# Patient Record
Sex: MALE | Race: OTHER | NOT HISPANIC OR LATINO | Employment: OTHER | URBAN - METROPOLITAN AREA
[De-identification: names, ages, dates, MRNs, and addresses within clinical notes are randomized per-mention and may not be internally consistent; named-entity substitution may affect disease eponyms.]

---

## 2017-01-04 ENCOUNTER — PATIENT OUTREACH (OUTPATIENT)
Dept: OTHER | Facility: OTHER | Age: 42
End: 2017-01-04
Payer: COMMERCIAL

## 2017-01-04 DIAGNOSIS — I10 ESSENTIAL HYPERTENSION: Primary | ICD-10-CM

## 2017-01-05 RX ORDER — CHLORTHALIDONE 25 MG/1
25 TABLET ORAL DAILY
Qty: 30 TABLET | Refills: 1 | Status: SHIPPED | OUTPATIENT
Start: 2017-01-05 | End: 2017-01-23 | Stop reason: SDUPTHER

## 2017-01-05 NOTE — PROGRESS NOTES
Mr. Patton is doing fine. He is tolerating hydralazine increase without problems. He attributes elevated reading yesterday to lack of sleep.     Last 5 Patient Entered Readings                                                               Current 30 Day Average: 159/86     Recent Readings 1/4/2017 12/28/2016 12/28/2016 12/14/2016 12/14/2016    Systolic BP (mmHg) 191 151 150 144 157    Diastolic BP (mmHg) 102 77 79 77 81    Pulse 66 72 75 74 75      BP elevated  Start chlorthalidone 25mg QAM  Continue monitoring    Outpatient Hypertension Medications as of 1/5/2017             amlodipine (NORVASC) 10 MG tablet Take 1 tablet (10 mg total) by mouth once daily.    carvedilol (COREG) 25 MG tablet Take 1 tablet (25 mg total) by mouth 2 (two) times daily with meals.    chlorthalidone (HYGROTEN) 25 MG Tab Take 1 tablet (25 mg total) by mouth once daily.    hydrALAZINE (APRESOLINE) 100 MG tablet Take 1 tablet (100 mg total) by mouth every 8 (eight) hours.    lisinopril (PRINIVIL,ZESTRIL) 40 MG tablet Take 1 tablet (40 mg total) by mouth once daily.

## 2017-01-12 ENCOUNTER — PATIENT OUTREACH (OUTPATIENT)
Dept: OTHER | Facility: OTHER | Age: 42
End: 2017-01-12
Payer: COMMERCIAL

## 2017-01-12 NOTE — PROGRESS NOTES
Last 5 Patient Entered Readings                                                               Current 30 Day Average: 135/73     Recent Readings 2/28/2017 2/28/2017 2/26/2017 2/26/2017 2/10/2017    Systolic BP (mmHg) 110 119 148 172 127    Diastolic BP (mmHg) 59 65 82 91 65    Pulse 74 73 76 77 74        LVM to follow up with Mr. Edgar Patton. Inquired about how his low sodium diet and exercise is going. Advised pt to call or message back if he has any questions or concerns.

## 2017-01-23 ENCOUNTER — PATIENT OUTREACH (OUTPATIENT)
Dept: OTHER | Facility: OTHER | Age: 42
End: 2017-01-23
Payer: COMMERCIAL

## 2017-01-23 DIAGNOSIS — E11.9 TYPE 2 DIABETES MELLITUS WITHOUT COMPLICATION, WITHOUT LONG-TERM CURRENT USE OF INSULIN: ICD-10-CM

## 2017-01-23 DIAGNOSIS — I10 ESSENTIAL HYPERTENSION: ICD-10-CM

## 2017-01-23 RX ORDER — LISINOPRIL 40 MG/1
40 TABLET ORAL DAILY
Qty: 90 TABLET | Refills: 2 | Status: SHIPPED | OUTPATIENT
Start: 2017-01-23 | End: 2018-10-30 | Stop reason: SDUPTHER

## 2017-01-23 RX ORDER — CHLORTHALIDONE 25 MG/1
25 TABLET ORAL DAILY
Qty: 90 TABLET | Refills: 2 | Status: SHIPPED | OUTPATIENT
Start: 2017-01-23 | End: 2017-02-13

## 2017-01-23 RX ORDER — AMLODIPINE BESYLATE 10 MG/1
10 TABLET ORAL DAILY
Qty: 90 TABLET | Refills: 2 | Status: SHIPPED | OUTPATIENT
Start: 2017-01-23 | End: 2018-08-20

## 2017-01-23 RX ORDER — CARVEDILOL 25 MG/1
25 TABLET ORAL 2 TIMES DAILY WITH MEALS
Qty: 180 TABLET | Refills: 2 | Status: SHIPPED | OUTPATIENT
Start: 2017-01-23 | End: 2018-10-30 | Stop reason: SDUPTHER

## 2017-01-23 RX ORDER — HYDRALAZINE HYDROCHLORIDE 100 MG/1
100 TABLET, FILM COATED ORAL EVERY 8 HOURS
Qty: 270 TABLET | Refills: 2 | Status: SHIPPED | OUTPATIENT
Start: 2017-01-23 | End: 2017-02-13

## 2017-01-23 NOTE — PROGRESS NOTES
Mr. Patton is doing well. He attributes better readings today to sleeping over night. He notes he discontinued amlodipine in error. He would like all BP meds refilled.     Last 5 Patient Entered Readings                                                               Current 30 Day Average: 166/97     Recent Readings 1/23/2017 1/23/2017 1/22/2017 1/18/2017 1/4/2017    Systolic BP (mmHg) 143 152 179 175 174    Diastolic BP (mmHg) 90 92 117 101 95    Pulse 65 64 73 80 68      BP trending down?  Restart amlodipine 10mg daily  Continue monitoring    Outpatient Hypertension Medications as of 1/23/2017             amlodipine (NORVASC) 10 MG tablet Take 1 tablet (10 mg total) by mouth once daily.    carvedilol (COREG) 25 MG tablet Take 1 tablet (25 mg total) by mouth 2 (two) times daily with meals.    chlorthalidone (HYGROTEN) 25 MG Tab Take 1 tablet (25 mg total) by mouth once daily.    hydrALAZINE (APRESOLINE) 100 MG tablet Take 1 tablet (100 mg total) by mouth every 8 (eight) hours.    lisinopril (PRINIVIL,ZESTRIL) 40 MG tablet Take 1 tablet (40 mg total) by mouth once daily.

## 2017-02-08 ENCOUNTER — NURSE TRIAGE (OUTPATIENT)
Dept: ADMINISTRATIVE | Facility: CLINIC | Age: 42
End: 2017-02-08

## 2017-02-08 NOTE — TELEPHONE ENCOUNTER
"  Reason for Disposition   [1] Caller requesting NON-URGENT health information AND [2] PCP's office is the best resource    Answer Assessment - Initial Assessment Questions  1. REASON FOR CALL or QUESTION: "What is your reason for calling today?" or "How can I best help you?" or "What question do you have that I can help answer?"      Patient's spouse called to report he is experiencing dizziness when his blood pressure is within normal range. She states his blood pressure was 180/90 this morning prior to taking medication. She states after medication his blood pressure went down to 126/80. Caller would like to know if medication adjustment needs to be made.    Protocols used: ST INFORMATION ONLY CALL-A-AH    "

## 2017-02-13 ENCOUNTER — PATIENT OUTREACH (OUTPATIENT)
Dept: OTHER | Facility: OTHER | Age: 42
End: 2017-02-13
Payer: COMMERCIAL

## 2017-02-13 NOTE — PROGRESS NOTES
Mr. Patton is doing ok. He was dizzy last week due to BP lowering. He feels better now. He discontinued hydralazine on his own. He also notes he never started chlorthalidone as directed.       Last 5 Patient Entered Readings                                                               Current 30 Day Average: 154/91     Recent Readings 2/10/2017 2/10/2017 2/8/2017 2/7/2017 2/7/2017    Systolic BP (mmHg) 127 132 132 143 144    Diastolic BP (mmHg) 65 71 74 76 83    Pulse 74 73 69 74 77        BP trending down  Increase monitoring frequency  Discussed with pt that it is normal to have some dizziness- will cut back on medication if needed    Hypertension Medications             amlodipine (NORVASC) 10 MG tablet Take 1 tablet (10 mg total) by mouth once daily.    carvedilol (COREG) 25 MG tablet Take 1 tablet (25 mg total) by mouth 2 (two) times daily with meals.    lisinopril (PRINIVIL,ZESTRIL) 40 MG tablet Take 1 tablet (40 mg total) by mouth once daily.

## 2017-02-21 ENCOUNTER — PATIENT OUTREACH (OUTPATIENT)
Dept: OTHER | Facility: OTHER | Age: 42
End: 2017-02-21
Payer: COMMERCIAL

## 2017-02-21 NOTE — PROGRESS NOTES
Mr. Patton is feeling much better since med adjustment. He is no longer dizzy. He has not had a chance to take BP readings.     Last 5 Patient Entered Readings                                                               Current 30 Day Average: 151/90     Recent Readings 2/10/2017 2/10/2017 2/8/2017 2/7/2017 2/7/2017    Systolic BP (mmHg) 127 132 132 143 144    Diastolic BP (mmHg) 65 71 74 76 83    Pulse 74 73 69 74 77      BP elevated  Continue monitoring    Hypertension Medications             amlodipine (NORVASC) 10 MG tablet Take 1 tablet (10 mg total) by mouth once daily.    carvedilol (COREG) 25 MG tablet Take 1 tablet (25 mg total) by mouth 2 (two) times daily with meals.    lisinopril (PRINIVIL,ZESTRIL) 40 MG tablet Take 1 tablet (40 mg total) by mouth once daily.

## 2017-03-15 ENCOUNTER — PATIENT OUTREACH (OUTPATIENT)
Dept: OTHER | Facility: OTHER | Age: 42
End: 2017-03-15
Payer: COMMERCIAL

## 2017-03-15 NOTE — PROGRESS NOTES
Mr. Patton is doing well. He has not had any more dizziness or weakness. He is having trouble taking a reading. He will try again shortly.     Last 5 Patient Entered Readings                                                               Current 30 Day Average: 144/82     Recent Readings 3/4/2017 3/4/2017 2/28/2017 2/28/2017 2/26/2017    Systolic BP (mmHg) 158 162 110 119 148    Diastolic BP (mmHg) 98 98 59 65 82    Pulse 63 63 74 73 76      BP above goal  Continue monitoring  Consider additional therapy    Hypertension Medications             amlodipine (NORVASC) 10 MG tablet Take 1 tablet (10 mg total) by mouth once daily.    carvedilol (COREG) 25 MG tablet Take 1 tablet (25 mg total) by mouth 2 (two) times daily with meals.    lisinopril (PRINIVIL,ZESTRIL) 40 MG tablet Take 1 tablet (40 mg total) by mouth once daily.

## 2017-03-17 ENCOUNTER — PATIENT OUTREACH (OUTPATIENT)
Dept: OTHER | Facility: OTHER | Age: 42
End: 2017-03-17
Payer: COMMERCIAL

## 2017-03-17 NOTE — PROGRESS NOTES
Last 5 Patient Entered Readings                                                               Current 30 Day Average: 154/93     Recent Readings 3/20/2017 3/20/2017 3/15/2017 3/4/2017 3/4/2017    Systolic BP (mmHg) 138 151 201 158 162    Diastolic BP (mmHg) 80 84 130 98 98    Pulse 79 79 64 63 63        LVM to follow up with Mr. Edgar Patton. Requested patient take at least 2 blood pressure readings each day. Inquired about how his low sodium diet and exercise is going. Advised pt to call or message back.

## 2017-04-05 ENCOUNTER — PATIENT OUTREACH (OUTPATIENT)
Dept: OTHER | Facility: OTHER | Age: 42
End: 2017-04-05
Payer: COMMERCIAL

## 2017-04-05 DIAGNOSIS — I10 ESSENTIAL HYPERTENSION: Primary | ICD-10-CM

## 2017-04-05 RX ORDER — CHLORTHALIDONE 25 MG/1
25 TABLET ORAL DAILY
Qty: 30 TABLET | Refills: 1 | Status: SHIPPED | OUTPATIENT
Start: 2017-04-05 | End: 2017-04-10 | Stop reason: DRUGHIGH

## 2017-04-05 NOTE — PROGRESS NOTES
Called patient to review readings. Want to start chlorthalidone. LVM.     Last 5 Patient Entered Readings                                                               Current 30 Day Average: 165/97     Recent Readings 4/5/2017 4/5/2017 4/5/2017 3/20/2017 3/20/2017    Systolic BP (mmHg) 149 153 154 138 151    Diastolic BP (mmHg) 75 82 82 80 84    Pulse 84 82 81 79 79

## 2017-04-10 RX ORDER — CHLORTHALIDONE 25 MG/1
12.5 TABLET ORAL DAILY
COMMUNITY
End: 2018-08-20

## 2017-04-10 NOTE — PROGRESS NOTES
Mr. Patton is doing well.  No questions or concerns. He will start chlorthalidone tomorrow.   Last 5 Patient Entered Readings                                                               Current 30 Day Average: 165/97     Recent Readings 4/5/2017 4/5/2017 4/5/2017 3/20/2017 3/20/2017    Systolic BP (mmHg) 149 153 154 138 151    Diastolic BP (mmHg) 75 82 82 80 84    Pulse 84 82 81 79 79      BP elevated  Start chlorthalidone 12.5mg daily  BMP at next encounter    Hypertension Medications             amlodipine (NORVASC) 10 MG tablet Take 1 tablet (10 mg total) by mouth once daily.    carvedilol (COREG) 25 MG tablet Take 1 tablet (25 mg total) by mouth 2 (two) times daily with meals.    chlorthalidone (HYGROTEN) 25 MG Tab Take 12.5 mg by mouth once daily.    lisinopril (PRINIVIL,ZESTRIL) 40 MG tablet Take 1 tablet (40 mg total) by mouth once daily.

## 2017-04-21 ENCOUNTER — PATIENT OUTREACH (OUTPATIENT)
Dept: OTHER | Facility: OTHER | Age: 42
End: 2017-04-21
Payer: COMMERCIAL

## 2017-04-21 NOTE — PROGRESS NOTES
"Last 5 Patient Entered Readings                                                               Current 30 Day Average: 152/79     Recent Readings 4/5/2017 4/5/2017 4/5/2017 3/20/2017 3/20/2017    Systolic BP (mmHg) 149 153 154 138 151    Diastolic BP (mmHg) 75 82 82 80 84    Pulse 84 82 81 79 79        Follow up with Mr. Edgar Patton completed. Patient reports that he is having tech issues. When he tries to check his BP it says "connection lost". He is available for troubleshooting next week. He will make sure bluetooth is connected. Patient did not have any further questions or concerns. I will follow up in a few weeks to see how he is doing and progressing.        "

## 2017-05-15 ENCOUNTER — PATIENT OUTREACH (OUTPATIENT)
Dept: OTHER | Facility: OTHER | Age: 42
End: 2017-05-15
Payer: COMMERCIAL

## 2017-05-15 NOTE — PROGRESS NOTES
Last 5 Patient Entered Readings                                                               Current 30 Day Average: /     Recent Readings 4/5/2017 4/5/2017 4/5/2017 3/20/2017 3/20/2017    Systolic BP (mmHg) 149 153 154 138 151    Diastolic BP (mmHg) 75 82 82 80 84    Pulse 84 82 81 79 79        5/29 - Follow up with Mr. Edgar Patton completed. Mr. Patton is doing well. Patient reports that he has been working nights which is why he has been so hard to get in touch with. He requests that IT support reach out 5/30/17. Patient did not have any further questions or concerns. I will follow up in a few weeks to see how he is doing and progressing.      5/30 - Patient was contacted by IT support. LVM. Non-compliance letter sent.    6/12 - LVM. Will drop Friday if no call back.    6/19 - Patient remains non-compliant. [facility]

## 2017-05-15 NOTE — LETTER
Bertha Sutherland  [address] [place] [address]     Dear Edgar,    Thank you for enrolling in the [facility]. To participate in our program, we ask that you submit a blood pressure reading at least once weekly through your [facility] Account and maintain regular contact with your Care Team.  We have not received any data or heard from you in some time.     The Digital Medicine Care Team has attempted to reach you on multiple occasions to determine if you would like to continue participating in the program. While we encourage you to continue participating fully, we understand that circumstances may change.      To continue participating in the program, please contact me at [phone]. If we do not hear back, you will be un-enrolled and your physician will be notified of your decision.    If you have submitted blood pressure readings during the past 55 days and believe you are receiving this letter in error, please call the Digital Medicine Patient Support Line at [phone] for troubleshooting.      We look forward to hearing from you soon.    Sincerely,     Bertha Sutherland  [facility]                                                                                                                         Edgar Patton  [address] [facility] [address]

## 2017-11-29 ENCOUNTER — HOSPITAL ENCOUNTER (EMERGENCY)
Facility: HOSPITAL | Age: 42
Discharge: HOME | End: 2017-11-29
Attending: FAMILY MEDICINE
Payer: COMMERCIAL

## 2017-11-29 VITALS
TEMPERATURE: 98 F | HEART RATE: 63 BPM | WEIGHT: 290 LBS | HEIGHT: 69 IN | RESPIRATION RATE: 20 BRPM | BODY MASS INDEX: 42.95 KG/M2 | DIASTOLIC BLOOD PRESSURE: 102 MMHG | SYSTOLIC BLOOD PRESSURE: 177 MMHG | OXYGEN SATURATION: 96 %

## 2017-11-29 DIAGNOSIS — I10 UNCONTROLLED HYPERTENSION: ICD-10-CM

## 2017-11-29 DIAGNOSIS — L03.012 CELLULITIS OF FINGER OF LEFT HAND: Primary | ICD-10-CM

## 2017-11-29 PROCEDURE — 63600175 PHARM REV CODE 636 W HCPCS: Performed by: FAMILY MEDICINE

## 2017-11-29 PROCEDURE — 25000003 PHARM REV CODE 250: Performed by: FAMILY MEDICINE

## 2017-11-29 PROCEDURE — 99283 EMERGENCY DEPT VISIT LOW MDM: CPT

## 2017-11-29 RX ORDER — AMLODIPINE BESYLATE 5 MG/1
10 TABLET ORAL
Status: COMPLETED | OUTPATIENT
Start: 2017-11-29 | End: 2017-11-29

## 2017-11-29 RX ORDER — TRAMADOL HYDROCHLORIDE 50 MG/1
50 TABLET ORAL
Status: COMPLETED | OUTPATIENT
Start: 2017-11-29 | End: 2017-11-29

## 2017-11-29 RX ORDER — SULFAMETHOXAZOLE AND TRIMETHOPRIM 800; 160 MG/1; MG/1
2 TABLET ORAL
Status: COMPLETED | OUTPATIENT
Start: 2017-11-29 | End: 2017-11-29

## 2017-11-29 RX ORDER — SULFAMETHOXAZOLE AND TRIMETHOPRIM 800; 160 MG/1; MG/1
1 TABLET ORAL 2 TIMES DAILY
Qty: 14 TABLET | Refills: 0 | Status: SHIPPED | OUTPATIENT
Start: 2017-11-29 | End: 2017-12-06

## 2017-11-29 RX ORDER — KETOROLAC TROMETHAMINE 30 MG/ML
60 INJECTION, SOLUTION INTRAMUSCULAR; INTRAVENOUS
Status: COMPLETED | OUTPATIENT
Start: 2017-11-29 | End: 2017-11-29

## 2017-11-29 RX ORDER — TRAMADOL HYDROCHLORIDE 50 MG/1
50 TABLET ORAL EVERY 6 HOURS PRN
Qty: 12 TABLET | Refills: 0 | Status: SHIPPED | OUTPATIENT
Start: 2017-11-29 | End: 2017-12-09

## 2017-11-29 RX ORDER — CARVEDILOL 12.5 MG/1
25 TABLET ORAL
Status: COMPLETED | OUTPATIENT
Start: 2017-11-29 | End: 2017-11-29

## 2017-11-29 RX ADMIN — AMLODIPINE BESYLATE 10 MG: 5 TABLET ORAL at 10:11

## 2017-11-29 RX ADMIN — SULFAMETHOXAZOLE AND TRIMETHOPRIM 2 TABLET: 800; 160 TABLET ORAL at 11:11

## 2017-11-29 RX ADMIN — CARVEDILOL 25 MG: 12.5 TABLET, FILM COATED ORAL at 10:11

## 2017-11-29 RX ADMIN — KETOROLAC TROMETHAMINE 60 MG: 60 INJECTION, SOLUTION INTRAMUSCULAR at 11:11

## 2017-11-29 RX ADMIN — TRAMADOL HYDROCHLORIDE 50 MG: 50 TABLET, COATED ORAL at 09:11

## 2017-11-29 NOTE — ED NOTES
Inquired if pt needed nourishment, pt denied the need for food, pt requested ice water, request granted.

## 2017-11-29 NOTE — ED PROVIDER NOTES
"Encounter Date: 11/29/2017       History     Chief Complaint   Patient presents with    Hand Pain     Left index finger swelling that began last night. Pt reports "hurting bad" Pt denies recent injury, but admits that 4 weeks ago, he punctured that finger with a drill.     42-year-old male presents to ER chief complaint of left second digit pain which started this morning.  Patient reports about 3-4 weeks ago had a puncture wound caused by a Micky's bit and at that time did not have a pain or discomfort.  Patient reports this morning started having pain and swelling to that area of his left second digit without erythema.  Denies any fever or chills.  Nausea nausea vomiting.  Patient denies any medical history but medical records does reflect has diabetes and hypertension.          Review of patient's allergies indicates:  No Known Allergies  Past Medical History:   Diagnosis Date    Diabetes mellitus, type 2     Hypertension      No past surgical history on file.  Family History   Problem Relation Age of Onset    Hypertension Mother     Diabetes Mother     Polycystic kidney disease Mother     Arthritis Mother     Hypertension Father     Diabetes Father     Polycystic kidney disease Sister      Social History   Substance Use Topics    Smoking status: Never Smoker    Smokeless tobacco: Not on file    Alcohol use No     Review of Systems   Constitutional: Negative for chills and fever.   Skin: Negative for rash and wound.   All other systems reviewed and are negative.      Physical Exam     Initial Vitals [11/29/17 0932]   BP Pulse Resp Temp SpO2   (!) 226/134 82 20 98 °F (36.7 °C) 98 %      MAP       164.67         Physical Exam    Nursing note and vitals reviewed.  Constitutional: He appears well-developed and well-nourished.   HENT:   Head: Normocephalic and atraumatic.   Eyes: EOM are normal. Pupils are equal, round, and reactive to light.   Neck: Normal range of motion. Neck supple. "   Cardiovascular: Normal rate, regular rhythm and normal heart sounds.   Pulmonary/Chest: Breath sounds normal.   Abdominal: Soft.   Musculoskeletal: Normal range of motion.        Left hand: He exhibits tenderness.        Hands:  Neurological: He is alert and oriented to person, place, and time.   Skin: Skin is warm. Capillary refill takes less than 2 seconds.   Psychiatric: He has a normal mood and affect. His behavior is normal.         ED Course   Procedures  Labs Reviewed - No data to display                     11:30 AM patient admits to poor medication compliance of the last 3 days states had not taken his blood pressure medication blood pressure is coming down into a more reasonable range after taking his home medications.  We'll discharge the patient on by mouth antibiotics and follow-up closely with primary care physician.        ED Course      Clinical Impression:   The primary encounter diagnosis was Cellulitis of finger of left hand. A diagnosis of Uncontrolled hypertension was also pertinent to this visit.                           Ilya Edwards MD  11/29/17 1267

## 2017-11-29 NOTE — ED NOTES
"Inquired if pt has had his BP medication, pt stated "No I have not had it in 3 days"  Informed MD of pt's information  "

## 2018-05-12 DIAGNOSIS — E11.9 TYPE 2 DIABETES MELLITUS WITHOUT COMPLICATION, WITHOUT LONG-TERM CURRENT USE OF INSULIN: ICD-10-CM

## 2018-05-12 DIAGNOSIS — I10 ESSENTIAL HYPERTENSION: ICD-10-CM

## 2018-05-14 RX ORDER — LISINOPRIL 40 MG/1
TABLET ORAL
Qty: 90 TABLET | Refills: 3 | OUTPATIENT
Start: 2018-05-14

## 2018-05-14 RX ORDER — CARVEDILOL 25 MG/1
TABLET ORAL
Qty: 60 TABLET | Refills: 11 | OUTPATIENT
Start: 2018-05-14

## 2018-08-07 ENCOUNTER — TELEPHONE (OUTPATIENT)
Dept: INTERNAL MEDICINE | Facility: CLINIC | Age: 43
End: 2018-08-07

## 2018-08-07 NOTE — TELEPHONE ENCOUNTER
----- Message from Basim Cotton MD sent at 8/7/2018  9:17 AM CDT -----  Contact: Pt's Wife Mrs Patton [phone]  She may call and make an appt. thx    ----- Message -----  From: Daisha Dougherty  Sent: 8/7/2018   9:05 AM  To: Y[facility] Stallworth Staff    Patient's wife is requesting a call back from the nurse to see about getting her  in on Weds or Thursday.  She reports his BP has been getting high. I offered today at 4pm but she declined and asked for a message to be sent.    Patient did not want to schedule with a different provider.    Patient may reached at  [phone] or [phone].    Thank you.  LC

## 2018-08-07 NOTE — TELEPHONE ENCOUNTER
Spoke with wife- she said that pt's b/p is 200/110,and has never seen Dr Cotton before,and is demanding a appointment tomorrow or Thursday. I told her I would have to check with Dr Cotton,but with a high b/p like that pt needs to be in ER, or UC.       She said that at present his b/p is 178/108.    Please advise if you will see him for HTN this high before his NPP.

## 2018-08-20 ENCOUNTER — OFFICE VISIT (OUTPATIENT)
Dept: INTERNAL MEDICINE | Facility: CLINIC | Age: 43
End: 2018-08-20
Payer: COMMERCIAL

## 2018-08-20 ENCOUNTER — LAB VISIT (OUTPATIENT)
Dept: LAB | Facility: HOSPITAL | Age: 43
End: 2018-08-20
Attending: FAMILY MEDICINE
Payer: COMMERCIAL

## 2018-08-20 VITALS
BODY MASS INDEX: 43.57 KG/M2 | HEART RATE: 68 BPM | DIASTOLIC BLOOD PRESSURE: 93 MMHG | RESPIRATION RATE: 16 BRPM | WEIGHT: 294.13 LBS | TEMPERATURE: 99 F | HEIGHT: 69 IN | SYSTOLIC BLOOD PRESSURE: 153 MMHG

## 2018-08-20 DIAGNOSIS — I10 MALIGNANT HYPERTENSION: Chronic | ICD-10-CM

## 2018-08-20 DIAGNOSIS — R82.998 DARK URINE: ICD-10-CM

## 2018-08-20 DIAGNOSIS — Z00.00 ROUTINE GENERAL MEDICAL EXAMINATION AT A HEALTH CARE FACILITY: ICD-10-CM

## 2018-08-20 DIAGNOSIS — R60.0 LOWER EXTREMITY EDEMA: ICD-10-CM

## 2018-08-20 DIAGNOSIS — Q61.3 PKD (POLYCYSTIC KIDNEY DISEASE): ICD-10-CM

## 2018-08-20 DIAGNOSIS — I51.89 DIASTOLIC DYSFUNCTION WITHOUT HEART FAILURE: Chronic | ICD-10-CM

## 2018-08-20 DIAGNOSIS — I10 ESSENTIAL HYPERTENSION: ICD-10-CM

## 2018-08-20 DIAGNOSIS — N18.2 CKD (CHRONIC KIDNEY DISEASE), STAGE II: ICD-10-CM

## 2018-08-20 DIAGNOSIS — E66.01 MORBID OBESITY: ICD-10-CM

## 2018-08-20 DIAGNOSIS — E11.9 TYPE 2 DIABETES MELLITUS WITHOUT COMPLICATION, WITHOUT LONG-TERM CURRENT USE OF INSULIN: ICD-10-CM

## 2018-08-20 DIAGNOSIS — E11.21 DIABETIC NEPHROPATHY ASSOCIATED WITH TYPE 2 DIABETES MELLITUS: ICD-10-CM

## 2018-08-20 DIAGNOSIS — Z00.00 ROUTINE GENERAL MEDICAL EXAMINATION AT A HEALTH CARE FACILITY: Primary | ICD-10-CM

## 2018-08-20 DIAGNOSIS — Q61.2 POLYCYSTIC KIDNEY DISEASE, AUTOSOMAL DOMINANT: ICD-10-CM

## 2018-08-20 DIAGNOSIS — N52.9 ERECTILE DYSFUNCTION, UNSPECIFIED ERECTILE DYSFUNCTION TYPE: ICD-10-CM

## 2018-08-20 LAB
25(OH)D3+25(OH)D2 SERPL-MCNC: 14 NG/ML
ALBUMIN SERPL BCP-MCNC: 3.8 G/DL
ALP SERPL-CCNC: 126 U/L
ALT SERPL W/O P-5'-P-CCNC: 30 U/L
ANION GAP SERPL CALC-SCNC: 8 MMOL/L
AST SERPL-CCNC: 17 U/L
BASOPHILS # BLD AUTO: 0.06 K/UL
BASOPHILS NFR BLD: 1 %
BILIRUB SERPL-MCNC: 0.6 MG/DL
BUN SERPL-MCNC: 17 MG/DL
CALCIUM SERPL-MCNC: 9.1 MG/DL
CHLORIDE SERPL-SCNC: 103 MMOL/L
CHOLEST SERPL-MCNC: 141 MG/DL
CHOLEST SERPL-MCNC: 141 MG/DL
CHOLEST/HDLC SERPL: 3.9 {RATIO}
CHOLEST/HDLC SERPL: 3.9 {RATIO}
CK SERPL-CCNC: 57 U/L
CO2 SERPL-SCNC: 25 MMOL/L
CREAT SERPL-MCNC: 1.1 MG/DL
DIFFERENTIAL METHOD: ABNORMAL
EOSINOPHIL # BLD AUTO: 0.2 K/UL
EOSINOPHIL NFR BLD: 3.3 %
ERYTHROCYTE [DISTWIDTH] IN BLOOD BY AUTOMATED COUNT: 13 %
EST. GFR  (AFRICAN AMERICAN): >60 ML/MIN/1.73 M^2
EST. GFR  (NON AFRICAN AMERICAN): >60 ML/MIN/1.73 M^2
ESTIMATED AVG GLUCOSE: 171 MG/DL
GLUCOSE SERPL-MCNC: 270 MG/DL
HBA1C MFR BLD HPLC: 7.6 %
HCT VFR BLD AUTO: 39.9 %
HDLC SERPL-MCNC: 36 MG/DL
HDLC SERPL-MCNC: 36 MG/DL
HDLC SERPL: 25.5 %
HDLC SERPL: 25.5 %
HGB BLD-MCNC: 13.4 G/DL
IMM GRANULOCYTES # BLD AUTO: 0.02 K/UL
IMM GRANULOCYTES NFR BLD AUTO: 0.3 %
LDLC SERPL CALC-MCNC: 85 MG/DL
LDLC SERPL CALC-MCNC: 85 MG/DL
LYMPHOCYTES # BLD AUTO: 1.6 K/UL
LYMPHOCYTES NFR BLD: 27.3 %
MCH RBC QN AUTO: 27.5 PG
MCHC RBC AUTO-ENTMCNC: 33.6 G/DL
MCV RBC AUTO: 82 FL
MONOCYTES # BLD AUTO: 0.3 K/UL
MONOCYTES NFR BLD: 5.2 %
NEUTROPHILS # BLD AUTO: 3.6 K/UL
NEUTROPHILS NFR BLD: 62.9 %
NONHDLC SERPL-MCNC: 105 MG/DL
NONHDLC SERPL-MCNC: 105 MG/DL
NRBC BLD-RTO: 0 /100 WBC
PLATELET # BLD AUTO: 261 K/UL
PMV BLD AUTO: 11 FL
POTASSIUM SERPL-SCNC: 3.9 MMOL/L
PROT SERPL-MCNC: 7.2 G/DL
RBC # BLD AUTO: 4.88 M/UL
SODIUM SERPL-SCNC: 136 MMOL/L
T4 FREE SERPL-MCNC: 0.93 NG/DL
TESTOST SERPL-MCNC: 141 NG/DL
TRIGL SERPL-MCNC: 100 MG/DL
TRIGL SERPL-MCNC: 100 MG/DL
TSH SERPL DL<=0.005 MIU/L-ACNC: 0.98 UIU/ML
WBC # BLD AUTO: 5.78 K/UL

## 2018-08-20 PROCEDURE — 83874 ASSAY OF MYOGLOBIN: CPT

## 2018-08-20 PROCEDURE — 84402 ASSAY OF FREE TESTOSTERONE: CPT

## 2018-08-20 PROCEDURE — 82306 VITAMIN D 25 HYDROXY: CPT

## 2018-08-20 PROCEDURE — 85025 COMPLETE CBC W/AUTO DIFF WBC: CPT

## 2018-08-20 PROCEDURE — 84439 ASSAY OF FREE THYROXINE: CPT

## 2018-08-20 PROCEDURE — 82550 ASSAY OF CK (CPK): CPT

## 2018-08-20 PROCEDURE — 99396 PREV VISIT EST AGE 40-64: CPT | Mod: S$GLB,,, | Performed by: FAMILY MEDICINE

## 2018-08-20 PROCEDURE — 80053 COMPREHEN METABOLIC PANEL: CPT

## 2018-08-20 PROCEDURE — 84443 ASSAY THYROID STIM HORMONE: CPT

## 2018-08-20 PROCEDURE — 36415 COLL VENOUS BLD VENIPUNCTURE: CPT | Mod: PO

## 2018-08-20 PROCEDURE — 99999 PR PBB SHADOW E&M-EST. PATIENT-LVL III: CPT | Mod: PBBFAC,,, | Performed by: FAMILY MEDICINE

## 2018-08-20 PROCEDURE — 80061 LIPID PANEL: CPT

## 2018-08-20 PROCEDURE — 83036 HEMOGLOBIN GLYCOSYLATED A1C: CPT

## 2018-08-20 PROCEDURE — 84403 ASSAY OF TOTAL TESTOSTERONE: CPT

## 2018-08-20 RX ORDER — METFORMIN HYDROCHLORIDE 1000 MG/1
1000 TABLET ORAL DAILY
COMMUNITY
End: 2018-08-22 | Stop reason: SDUPTHER

## 2018-08-20 RX ORDER — SILDENAFIL 100 MG/1
100 TABLET, FILM COATED ORAL
Qty: 10 TABLET | Refills: 5 | Status: SHIPPED | OUTPATIENT
Start: 2018-08-20 | End: 2018-10-30

## 2018-08-20 NOTE — PROGRESS NOTES
Subjective:   Patient ID: Edgar Patton is a 42 y.o. male.    Chief Complaint: Annual Exam      HPI  41 yo male here for annual exam. Concerned about blood pressure readings at home.   Asymptomatic today.     Patient queried and denies any further complaints.    PREVENTIVE MED  Diet  Exercise  Colorectal Ca  Alcohol use  Tobacco  BP  Depression  Type 2 DM  Hep C  STD  Vision  ALL REVIEWED      ALLERGIES AND MEDICATIONS: updated and reviewed.  Review of patient's allergies indicates:  No Known Allergies    Current Outpatient Medications:     metFORMIN (GLUCOPHAGE) 1000 MG tablet, Take 1,000 mg by mouth once daily., Disp: , Rfl:     carvedilol (COREG) 25 MG tablet, Take 1 tablet (25 mg total) by mouth 2 (two) times daily with meals., Disp: 180 tablet, Rfl: 2    lisinopril (PRINIVIL,ZESTRIL) 40 MG tablet, Take 1 tablet (40 mg total) by mouth once daily., Disp: 90 tablet, Rfl: 2    sildenafil (VIAGRA) 100 MG tablet, Take 1 tablet (100 mg total) by mouth as needed for Erectile Dysfunction., Disp: 10 tablet, Rfl: 5    Review of Systems   Constitutional: Negative for activity change, appetite change, chills, diaphoresis, fatigue, fever and unexpected weight change.   HENT: Negative for congestion, ear discharge, ear pain, facial swelling, hearing loss, nosebleeds, postnasal drip, rhinorrhea, sinus pressure, sneezing, sore throat, tinnitus, trouble swallowing and voice change.    Eyes: Negative for photophobia, pain, discharge, redness, itching and visual disturbance.   Respiratory: Negative for cough, chest tightness, shortness of breath and wheezing.    Cardiovascular: Negative for chest pain, palpitations and leg swelling.   Gastrointestinal: Negative for abdominal distention, abdominal pain, anal bleeding, blood in stool, constipation, diarrhea, nausea, rectal pain and vomiting.   Endocrine: Negative for cold intolerance, heat intolerance, polydipsia, polyphagia and polyuria.   Genitourinary: Negative for  "difficulty urinating, dysuria and flank pain.   Musculoskeletal: Negative for arthralgias, back pain, joint swelling, myalgias and neck pain.   Skin: Negative for rash.   Neurological: Negative for dizziness, tremors, seizures, syncope, speech difficulty, weakness, light-headedness, numbness and headaches.   Psychiatric/Behavioral: Negative for behavioral problems, confusion, decreased concentration, dysphoric mood, sleep disturbance and suicidal ideas. The patient is not nervous/anxious and is not hyperactive.        Objective:     Vitals:    08/20/18 0957   BP: (!) 153/93   Pulse: 68   Resp: 16   Temp: 98.8 °F (37.1 °C)   TempSrc: Oral   Weight: 133.4 kg (294 lb 1.5 oz)   Height: 5' 9" (1.753 m)   PainSc: 0-No pain     Body mass index is 43.43 kg/m².    Physical Exam   Constitutional: He is oriented to person, place, and time. He appears well-developed and well-nourished. He is cooperative. He does not have a sickly appearance. No distress.   HENT:   Head: Normocephalic and atraumatic.   Right Ear: Hearing, tympanic membrane, external ear and ear canal normal. No tenderness.   Left Ear: Hearing, tympanic membrane, external ear and ear canal normal. No tenderness.   Nose: Nose normal.   Mouth/Throat: Oropharynx is clear and moist.   Eyes: Conjunctivae and lids are normal. Pupils are equal, round, and reactive to light. Right eye exhibits no discharge. Left eye exhibits no discharge. Right conjunctiva is not injected. Left conjunctiva is not injected. No scleral icterus. Right eye exhibits normal extraocular motion. Left eye exhibits normal extraocular motion.   Neck: Normal range of motion. Neck supple. No JVD present. Carotid bruit is not present. No tracheal deviation and no edema present. No thyromegaly present.   Cardiovascular: Normal rate, regular rhythm, normal heart sounds and normal pulses. Exam reveals no friction rub.   No murmur heard.  Pulmonary/Chest: Effort normal and breath sounds normal. No " accessory muscle usage. No respiratory distress. He has no wheezes. He has no rhonchi. He has no rales.   Abdominal: Soft. Bowel sounds are normal. He exhibits no distension, no abdominal bruit, no pulsatile midline mass and no mass. There is no hepatosplenomegaly. There is no tenderness. There is no rebound, no guarding, no CVA tenderness, no tenderness at McBurney's point and negative Ren's sign.   Musculoskeletal: He exhibits no edema.   Lymphadenopathy:        Head (right side): No submandibular, no preauricular and no posterior auricular adenopathy present.        Head (left side): No submandibular, no preauricular and no posterior auricular adenopathy present.     He has no cervical adenopathy.   Neurological: He is alert and oriented to person, place, and time. GCS eye subscore is 4. GCS verbal subscore is 5. GCS motor subscore is 6.   Skin: Skin is warm and dry. No ecchymosis and no rash noted. Rash is not maculopapular and not urticarial. He is not diaphoretic. No cyanosis or erythema. Nails show no clubbing.   Psychiatric: He has a normal mood and affect. His speech is normal and behavior is normal. Thought content normal. His mood appears not anxious. His affect is not angry and not inappropriate. He does not exhibit a depressed mood.   Nursing note and vitals reviewed.      Assessment and Plan:   Edgar was seen today for annual exam.    Diagnoses and all orders for this visit:    Routine general medical examination at a health care facility  -     CBC auto differential; Future  -     Comprehensive metabolic panel; Future  -     Hemoglobin A1c; Future  -     Lipid panel; Future  -     TSH; Future  -     T4, free; Future  -     Vitamin D; Future  -     Urinalysis; Future  -     Urine culture; Future    Dark urine  -     CK; Future  -     MYOGLOBIN, SERUM; Future  -     Myoglobin, urine    PKD (polycystic kidney disease)  -     Comprehensive metabolic panel; Future  -     US Kidney; Future  -      Ambulatory consult to Nephrology    Erectile dysfunction, unspecified erectile dysfunction type  -     Testosterone; Future  -     Testosterone, free; Future    Malignant hypertension    Diastolic dysfunction without heart failure    Lower extremity edema  -     2D Echo w/ Color Flow Doppler; Future    Polycystic kidney disease, autosomal dominant    CKD (chronic kidney disease), stage II    Diabetic nephropathy associated with type 2 diabetes mellitus    Type 2 diabetes mellitus without complication, without long-term current use of insulin    Morbid obesity    Essential hypertension    Other orders  -     sildenafil (VIAGRA) 100 MG tablet; Take 1 tablet (100 mg total) by mouth as needed for Erectile Dysfunction.        Follow-up in about 3 months (around 11/20/2018).    THIS NOTE WILL BE SHARED WITH THE PATIENT.

## 2018-08-21 ENCOUNTER — HOSPITAL ENCOUNTER (OUTPATIENT)
Dept: RADIOLOGY | Facility: HOSPITAL | Age: 43
Discharge: HOME | End: 2018-08-21
Attending: FAMILY MEDICINE
Payer: COMMERCIAL

## 2018-08-21 DIAGNOSIS — Q61.3 PKD (POLYCYSTIC KIDNEY DISEASE): ICD-10-CM

## 2018-08-21 PROBLEM — N52.9 ERECTILE DYSFUNCTION: Status: ACTIVE | Noted: 2018-08-21

## 2018-08-21 PROBLEM — N52.9 ERECTILE DYSFUNCTION: Chronic | Status: ACTIVE | Noted: 2018-08-21

## 2018-08-21 LAB — MYOGLOBIN SERPL-MCNC: 62 MCG/L

## 2018-08-21 PROCEDURE — 76770 US EXAM ABDO BACK WALL COMP: CPT | Mod: TC,PO

## 2018-08-22 DIAGNOSIS — R79.89 LOW TESTOSTERONE: Primary | ICD-10-CM

## 2018-08-22 DIAGNOSIS — R31.29 MICROSCOPIC HEMATURIA: ICD-10-CM

## 2018-08-22 RX ORDER — ROSUVASTATIN CALCIUM 10 MG/1
10 TABLET, COATED ORAL DAILY
Qty: 30 TABLET | Refills: 2 | Status: SHIPPED | OUTPATIENT
Start: 2018-08-22 | End: 2018-10-30 | Stop reason: SDUPTHER

## 2018-08-22 RX ORDER — METFORMIN HYDROCHLORIDE 1000 MG/1
1000 TABLET ORAL 2 TIMES DAILY WITH MEALS
Qty: 60 TABLET | Refills: 2 | Status: SHIPPED | OUTPATIENT
Start: 2018-08-22 | End: 2018-10-30 | Stop reason: SDUPTHER

## 2018-08-22 RX ORDER — ERGOCALCIFEROL 1.25 MG/1
50000 CAPSULE ORAL
Qty: 8 CAPSULE | Refills: 0 | Status: SHIPPED | OUTPATIENT
Start: 2018-08-22 | End: 2018-10-30

## 2018-08-23 LAB — TESTOST FREE SERPL-MCNC: 4.3 PG/ML

## 2018-09-25 ENCOUNTER — OFFICE VISIT (OUTPATIENT)
Dept: NEPHROLOGY | Facility: CLINIC | Age: 43
End: 2018-09-25
Payer: COMMERCIAL

## 2018-09-25 VITALS
WEIGHT: 304.25 LBS | HEART RATE: 83 BPM | BODY MASS INDEX: 45.06 KG/M2 | DIASTOLIC BLOOD PRESSURE: 88 MMHG | HEIGHT: 69 IN | SYSTOLIC BLOOD PRESSURE: 160 MMHG | OXYGEN SATURATION: 97 %

## 2018-09-25 DIAGNOSIS — N18.2 CKD (CHRONIC KIDNEY DISEASE), STAGE II: Chronic | ICD-10-CM

## 2018-09-25 DIAGNOSIS — Q61.2 POLYCYSTIC KIDNEY DISEASE, AUTOSOMAL DOMINANT: Primary | Chronic | ICD-10-CM

## 2018-09-25 DIAGNOSIS — I12.9 HYPERTENSIVE KIDNEY DISEASE WITH STAGE 2 CHRONIC KIDNEY DISEASE: ICD-10-CM

## 2018-09-25 DIAGNOSIS — E11.21 DIABETIC NEPHROPATHY ASSOCIATED WITH TYPE 2 DIABETES MELLITUS: Chronic | ICD-10-CM

## 2018-09-25 DIAGNOSIS — N18.2 HYPERTENSIVE KIDNEY DISEASE WITH STAGE 2 CHRONIC KIDNEY DISEASE: ICD-10-CM

## 2018-09-25 DIAGNOSIS — E66.01 MORBID OBESITY: Chronic | ICD-10-CM

## 2018-09-25 PROCEDURE — 99999 PR PBB SHADOW E&M-EST. PATIENT-LVL III: CPT | Mod: PBBFAC,,, | Performed by: INTERNAL MEDICINE

## 2018-09-25 PROCEDURE — 99215 OFFICE O/P EST HI 40 MIN: CPT | Mod: S$GLB,,, | Performed by: INTERNAL MEDICINE

## 2018-09-25 NOTE — LETTER
September 30, 2018      Basim Cotton MD  [address] [facility]  6th [address]  [place] [address]           Brendan sandra - Nephrology  [address]  [facility] [address]  Phone: [phone]  Fax: [phone]          Patient: Edgar Patton   MR Number: [id number]   YOB: 1975   Date of Visit: 9/25/2018       Dear Dr. Basim Cotton:    Thank you for referring Edgar Patton to me for evaluation. Attached you will find relevant portions of my assessment and plan of care.    If you have questions, please do not hesitate to call me. I look forward to following Edgar Pattno along with you.    Sincerely,    Tawanda Lee MD    Enclosure  CC:  No Recipients    If you would like to receive this communication electronically, please contact [email or web address] or [phone] to request more information on [facility] access.    For providers and/or their staff who would like to refer a patient to [facility], please contact us through our one-stop-shop provider referral line, [facility], at [phone].    If you feel you have received this communication in error or would no longer like to receive these types of communications, please e-mail [email or web address]

## 2018-09-30 PROBLEM — I12.9 HYPERTENSIVE KIDNEY DISEASE WITH STAGE 2 CHRONIC KIDNEY DISEASE: Status: ACTIVE | Noted: 2018-09-30

## 2018-09-30 PROBLEM — N18.2 HYPERTENSIVE KIDNEY DISEASE WITH STAGE 2 CHRONIC KIDNEY DISEASE: Status: ACTIVE | Noted: 2018-09-30

## 2018-09-30 NOTE — PROGRESS NOTES
Subjective:       Patient ID: Edgar Patton is a 43 y.o.  or  male who presents for new evaluation of Chronic Kidney Disease and polycystic kidney disease    HPI     Mr. Patton was seen with his family members for new patient evaluation of polycystic kidney disease. He came basically to re-establish care for management of polycystic kidney disease and CKD. He was seen in nephrology clinic in may 2016 for the first time and since then due to insurance reason he could not return for follow up. 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 Interim he did have follow up in IM clinic mostly for management of his other medical problems like hypertension, diabetes.    Of note, he did not take his antihypertensives the night prior to this visit and the day of this visit. As a result he did have uncontrolled hypertension during this visit. He admitted he has had episodes of skipping medicines but for the most part he has been consistent in taking it. He does not follow BP at home routinely. He has gained around 25 lbs weight since his visit in may 2016. As a result he has BMI in 40's now.     He was accompanied by his wife. He has family h/o ADPKD, his mother has advanced polycystic kidney disease, diagnosed in her 50's. His sister also has PKD, diagnosed in her 30's. His maternal cousin has been diagnosed with PKD in his 40's and he already had 24 cm kidney size. There is no family h/o brain aneurysm/ ICH/ stroke.    He was diagnosed with hypertension in his early 30's and had uncontrolled hypertension for a long time period. He has hypertension, diabetes, PKD diagnosed in his 30's, morbid obesity.     He reports having severely uncontrolled hypertension when diagnosed and at times was in 230/140 range also. He reports he has had passage of kidney stones when he had CT scan that showed polycystic kidney  disease. He denies any flank pain/ hematuria/ UTI/ fever.     Most recently he denies any new symptoms. He denies flank or back pain. He denies passage of kidney stones/ hematuria/ fever/ leg swelling/ decreased urine/ SOB/ CP/ headaches.     Renal Function:  Lab Results   Component Value Date     (H) 08/20/2018     (H) 09/06/2016     08/20/2018     09/06/2016    K 3.9 08/20/2018    K 3.4 (L) 09/06/2016     08/20/2018     09/06/2016    CO2 25 08/20/2018    CO2 24 09/06/2016    BUN 17 08/20/2018    BUN 16 09/06/2016    CALCIUM 9.1 08/20/2018    CALCIUM 9.0 09/06/2016    CREATININE 1.1 08/20/2018    CREATININE 1.2 09/06/2016    ALBUMIN 3.8 08/20/2018    ALBUMIN 3.6 09/06/2016    PHOS 3.3 09/06/2016    PHOS 4.3 04/11/2016    ESTGFRAFRICA >60.0 08/20/2018    ESTGFRAFRICA >60.0 09/06/2016    EGFRNONAA >60.0 08/20/2018    EGFRNONAA >60.0 09/06/2016       Urinalysis:  Lab Results   Component Value Date    APPEARANCEUA Clear 08/20/2018    PHUR 7.0 08/20/2018    SPECGRAV 1.005 08/20/2018    PROTEINUA Negative 08/20/2018    GLUCUA Negative 08/20/2018    OCCULTUA 2+ (A) 08/20/2018    NITRITE Negative 08/20/2018    LEUKOCYTESUR Negative 08/20/2018       Protein/Creatinine Ratio:  Lab Results   Component Value Date    PROTEINURINE 47 (H) 06/06/2016    CREATRANDUR 314.0 06/06/2016    UTPCR 0.15 06/06/2016       CBC:  Lab Results   Component Value Date    WBC 5.78 08/20/2018    HGB 13.4 (L) 08/20/2018    HCT 39.9 (L) 08/20/2018     Vit D 14  No recent PTH, PTH 85 in 2016  A1C 7.6    US kidney 2016    The right kidney measures 16.9 cm in length. The left kidney measures 16.1 cm in length.  There is no hydronephrosis or nephrolithiasis.    US kidney 8/2018    The kidneys are enlarged in size bilaterally measuring 18.3 cm on the right and 19.5 cm on the left.  There is no evidence of hydronephrosis bilaterally. There is no evidence of nephrolithiasis.  Bilateral renal cysts are seen throughout  the kidneys.  No solid masses are seen.  Findings appear stable. Limited images of the urinary bladder are unremarkable    Review of Systems   Constitutional: Negative for activity change, appetite change, chills, fatigue and fever.   HENT: Negative for hearing loss, nosebleeds, rhinorrhea, sneezing and sore throat.    Eyes: Negative for pain and discharge.   Respiratory: Negative for cough, shortness of breath and wheezing.    Cardiovascular: Negative for chest pain and leg swelling.   Gastrointestinal: Negative for abdominal pain, diarrhea, nausea and vomiting.   Endocrine: Negative for polydipsia and polyuria.   Genitourinary: Negative for dysuria, flank pain, frequency and hematuria.   Musculoskeletal: Negative for back pain and myalgias.   Skin: Negative for pallor and rash.   Allergic/Immunologic: Negative for environmental allergies.   Neurological: Negative for dizziness, light-headedness and headaches.   Psychiatric/Behavioral: Negative for behavioral problems. The patient is not nervous/anxious.        Objective:      Physical Exam   Constitutional: He is oriented to person, place, and time. He appears well-developed and well-nourished. No distress.   HENT:   Head: Normocephalic and atraumatic.   Nose: Nose normal.   Mouth/Throat: Oropharynx is clear and moist. No oropharyngeal exudate.   Eyes: Right eye exhibits no discharge. Left eye exhibits no discharge.   Neck: Normal range of motion. Neck supple. No JVD present.   Cardiovascular: Normal rate, regular rhythm and normal heart sounds.   Pulmonary/Chest: Effort normal and breath sounds normal. No respiratory distress. He has no wheezes. He has no rales.   Abdominal: Soft. Bowel sounds are normal. He exhibits no distension.   Abdominal obesity   Musculoskeletal: Normal range of motion. He exhibits no edema or tenderness.   Neurological: He is alert and oriented to person, place, and time.   Skin: Skin is warm and dry. No rash noted. He is not diaphoretic.  No erythema.   Psychiatric: He has a normal mood and affect. His behavior is normal.   Vitals reviewed.      Assessment:       1. Polycystic kidney disease, autosomal dominant    2. CKD (chronic kidney disease), stage II    3. Diabetic nephropathy associated with type 2 diabetes mellitus    4. Hypertensive kidney disease with stage 2 chronic kidney disease    5. Morbid obesity        Plan:       Mr. Patton has ADPKD with significant family h/o such. His kidney size has worsened on US indicating his ADPKD is progressing. Of concern now he has morbid obesity, diabetes as a new diagnosis and suboptimally controlled hypertension which all have potential to cause progression of his CKD due to PKD. As such he has historically uncontrolled hypertension so CKD is likely due to both ADPKD and hypertension which has been poorly controlled. Onset of diabetes is more recent.    But overall given worsening kidney size on imaging which indicates worsening/ progressing ADPKD along with other risk factors which are not controlled, he has significant risk of progression of his CKD to higher stages.    We discussed about strict glycemic control, strict BP control, low salt diet, avoiding NSAID, ideal body weight, plenty of oral fluids, decrease animal protein given h/o kidney stones. We also discussed about potential for worsening of renal function from progression of ADPKD which seems to have happened to his mom at present. We also discussed about having his children get screening for polycystic kidney disease per their pediatrician recommendations. Defer BP control to his PCP. He seems to have secondary hypertension which could be from his underlying polycystic kidney disease.     Following plan was discussed with him and his wife in detail:    Weight reduction by calorie control, consider aerobic activity     Consistency in taking antihypertensives, home BP monitoring, goal should be less than 130/80, strict low salt diet,  continue ACE-I for RAAS blockade     US kidneys annually, watch for gross hematuria, micro hematuria likely due to PKD, watch for fever/ cloudy urine/ flank pain as higher risk for cyst infections, watch for sudden onset of back/ flank pain and hematuria as it can indicate cyst rupture. Avoid contact sports as PKD progresses as kidney size will continue to grow.     Risk of CKD progression more with uncontrolled hypertension    Diabetes management per PCP. Consider holding metformin if creatinine rises above 1.5.    Continue ergocalciferol for correction of vit D, follow corrected Ca, vit D    Additional plan:    - periodically monitor renal panel for electrolytes, acid base status, eGFR  - CKD staging, diagnosis, onset of CKD, potential risk of CKD progression, potential risk of JEFFREY due to volume depletion/ TOÑO/ ATN due to hemodynamic changes d/w patient  - stress to follow low salt diet, keep well hydrated, follow low K diet in case of any dyskalemia, nutritional changes d/w patient   - trend urine studies for proteinuria  - avoid NSAID/ bactrim/ IV contrast/ gadolinium/ aminoglycoside/ fleet enema/ PPI where possible  - trend H/H periodically if CKD stage progresses        Plan to RTC in 4 to 6 months. His questions were answered to his satisfaction, his wife was also present during this visit and they were al updated on the plan.

## 2018-10-08 ENCOUNTER — INITIAL CONSULT (OUTPATIENT)
Dept: OPHTHALMOLOGY | Facility: CLINIC | Age: 43
End: 2018-10-08
Payer: COMMERCIAL

## 2018-10-08 DIAGNOSIS — E11.9 DM TYPE 2 WITHOUT RETINOPATHY: ICD-10-CM

## 2018-10-08 DIAGNOSIS — H52.7 REFRACTIVE ERROR: ICD-10-CM

## 2018-10-08 DIAGNOSIS — H40.003 GLAUCOMA SUSPECT OF BOTH EYES: Primary | ICD-10-CM

## 2018-10-08 DIAGNOSIS — I10 ESSENTIAL HYPERTENSION: ICD-10-CM

## 2018-10-08 PROCEDURE — 92020 GONIOSCOPY: CPT | Mod: S$GLB,,, | Performed by: OPHTHALMOLOGY

## 2018-10-08 PROCEDURE — 76514 ECHO EXAM OF EYE THICKNESS: CPT | Mod: S$GLB,,, | Performed by: OPHTHALMOLOGY

## 2018-10-08 PROCEDURE — 92004 COMPRE OPH EXAM NEW PT 1/>: CPT | Mod: S$GLB,,, | Performed by: OPHTHALMOLOGY

## 2018-10-08 PROCEDURE — 99999 PR PBB SHADOW E&M-EST. PATIENT-LVL II: CPT | Mod: PBBFAC,,, | Performed by: OPHTHALMOLOGY

## 2018-10-08 RX ORDER — LATANOPROST 50 UG/ML
1 SOLUTION/ DROPS OPHTHALMIC NIGHTLY
Qty: 2.5 ML | Refills: 6 | Status: SHIPPED | OUTPATIENT
Start: 2018-10-08 | End: 2021-01-29

## 2018-10-08 NOTE — PROGRESS NOTES
Subjective:       Patient ID: Edgar Patton is a 43 y.o. male.    Chief Complaint: Diabetic Eye Exam    HPI     43 y.o. Male is here for Dm Check. Last Eye Exam was about six to seven   years ago. Was diagnose with glaucoma while living in [facility].  Denies eye   pain and f/f. No itching, burning or tearing. Vision is only blurry when   blood pressure increase. No problems with glare.     Eye Meds: Systane prn OU     Last edited by NEISHA Kuhn on 10/8/2018 10:25 AM. (History)             Assessment:       1. Glaucoma suspect of both eyes    2. DM type 2 without retinopathy    3. Essential hypertension    4. Refractive error        Plan:       Glaucoma suspect OU-Pt with h/o glaucoma dx'd in [place] yrs ago. Pt was seen  By an eye doctor here several yrs ago & told he did NOT have glaucoma.  Pt presents today with elevated IOP's OU, open angles on gonio & enlarged c/d ratios OD>OS. CCT's:  (+1),  (0).  DM-No NPDR OU.  HTN-No retinopathy OU.  RE-Pt wants WRx.        Start Xalatan OD.  Control DM & HTN.  Give WRx.  RTC 4 wks for IOP's, HVF's & OCT's.

## 2018-10-26 ENCOUNTER — OFFICE VISIT (OUTPATIENT)
Dept: URGENT CARE | Facility: CLINIC | Age: 43
End: 2018-10-26
Payer: COMMERCIAL

## 2018-10-26 VITALS
DIASTOLIC BLOOD PRESSURE: 95 MMHG | TEMPERATURE: 98 F | SYSTOLIC BLOOD PRESSURE: 160 MMHG | OXYGEN SATURATION: 97 % | HEART RATE: 71 BPM | BODY MASS INDEX: 45.03 KG/M2 | HEIGHT: 69 IN | WEIGHT: 304 LBS

## 2018-10-26 DIAGNOSIS — B02.9 HERPES ZOSTER WITHOUT COMPLICATION: Primary | ICD-10-CM

## 2018-10-26 PROCEDURE — 99214 OFFICE O/P EST MOD 30 MIN: CPT | Mod: S$GLB,,, | Performed by: NURSE PRACTITIONER

## 2018-10-26 RX ORDER — VALACYCLOVIR HYDROCHLORIDE 1 G/1
1000 TABLET, FILM COATED ORAL 3 TIMES DAILY
Qty: 21 TABLET | Refills: 0 | Status: SHIPPED | OUTPATIENT
Start: 2018-10-26 | End: 2019-01-16 | Stop reason: ALTCHOICE

## 2018-10-26 NOTE — PROGRESS NOTES
"Subjective:       Patient ID: Edgar Patton is a 43 y.o. male.    Vitals:  height is 5' 9" (1.753 m) and weight is 137.9 kg (304 lb) (abnormal). His oral temperature is 97.6 °F (36.4 °C). His blood pressure is 160/95 (abnormal) and his pulse is 71. His oxygen saturation is 97%.     Chief Complaint: Numbness (RUQ down to pt's knee) and GI Problem    Pt is having pins and needles sensation on left side of abd that wraps around to the back that began yesterday. Pt denies any rash. Pt reports paresthesias are worse when his cloths touch the skin.       GI Problem   Primary symptoms do not include fever, abdominal pain, nausea, vomiting, dysuria or rash. The illness began yesterday. The onset was sudden. The problem has been gradually improving.   The illness does not include chills or back pain.     Review of Systems   Constitution: Negative for chills and fever.   Eyes: Negative for discharge.   Skin: Negative for flushing and rash.   Musculoskeletal: Negative for back pain.   Gastrointestinal: Negative for abdominal pain, nausea and vomiting.   Genitourinary: Negative for dysuria, genital sores, hematuria and urgency.   Neurological: Positive for numbness. Negative for dizziness.   Psychiatric/Behavioral: The patient is nervous/anxious.        Objective:      Physical Exam   Constitutional: He is oriented to person, place, and time. He appears well-developed and well-nourished.   HENT:   Head: Normocephalic and atraumatic. Head is without abrasion, without contusion and without laceration.   Right Ear: External ear normal.   Left Ear: External ear normal.   Nose: Nose normal.   Mouth/Throat: Oropharynx is clear and moist.   Eyes: Conjunctivae, EOM and lids are normal. Pupils are equal, round, and reactive to light.   Neck: Trachea normal, full passive range of motion without pain and phonation normal. Neck supple.   Cardiovascular: Normal rate, regular rhythm and normal heart sounds.   Pulmonary/Chest: Effort " normal and breath sounds normal. No stridor. No respiratory distress.   Musculoskeletal: Normal range of motion.   Neurological: He is alert and oriented to person, place, and time.   Skin: Skin is warm, dry and intact. Capillary refill takes less than 2 seconds. No abrasion, no bruising, no burn, no ecchymosis, no laceration, no lesion and no rash noted. No erythema.        Area of paresthesias    Psychiatric: He has a normal mood and affect. His speech is normal and behavior is normal. Judgment and thought content normal. Cognition and memory are normal.   Nursing note and vitals reviewed.      Assessment:       1. Herpes zoster without complication        Plan:         Herpes zoster without complication  -     valACYclovir (VALTREX) 1000 MG tablet; Take 1 tablet (1,000 mg total) by mouth 3 (three) times daily. for 7 days  Dispense: 21 tablet; Refill: 0    MDM:  Pt is having prodrome of shingles on T10 dermatome.    Patient Instructions   Shingles  Shingles is a viral infection caused by the same virus as chicken pox. Anyone who has had chicken pox may get shingles later in life. The virus stays in the body, but remains dormant (asleep). Shingles often occurs in older persons or persons with lowered immunity. But it can affect anyone at any age.  Shingles starts as a tingling patch of skin on one side of the body. Small, painful blisters may then appear. The rash does not spread to the rest of the body.  Exposure to shingles cannot cause shingles. However, it can cause chicken pox in anyone who has not had chicken pox or has not been vaccinated. The contagious period ends when all blisters have crusted over (generally about 2 weeks after the illness begins).  After the blisters heal, the affected skin may be sensitive or painful for months (neuralgia). This often gradually goes away.  A shingles vaccine is available. This can help prevent shingles or make it less painful. It is generally recommended for adults  over the age of 60 who have had chicken pox in the past, but who have never had shingles. Adults over 60 who have had neither chicken pox nor shingles can prevent both diseases with the chicken pox vaccine. Ask your healthcare provider about these vaccines.  Home care  · Medicines may be prescribed to help relieve pain. Take these medicines as directed. Ask your healthcare provider or pharmacist before using over-the-counter medicines for helping treat pain and itching.  · In certain cases, antiviral medicines may be prescribed to reduce pain, shorten the illness, and prevent neuralgia. Take these medicines as directed.  · Compresses made from a solution of cool water mixed with cornstarch or baking soda may help relieve pain and itching.   · Gently wash skin daily with soap and water to help prevent infection.  Be certain to rinse off all of the soap, which can be irritating.  · Trim fingernails and try not to scratch. Scratching the sores may leave scars.  · Stay home from work or school until all blisters have formed a crust and you are no longer contagious.  Follow-up care  Follow up with your healthcare provider or as directed by our staff.  When to seek medical advice  · Fever of 100.4°F (38°C) or higher, or as directed by your healthcare provider  · Affected skin is on the face or neck and any of the following occur:  ¨ Headache  ¨ Eye pain  ¨ Changes in vision  ¨ Sores near the eye  ¨ Weakness of facial muscles  · Pain, redness, or swelling of a joint  · Signs of skin infection: colored drainage from the sores, warmth, increasing redness, or increasing pain  Date Last Reviewed: 9/25/2015  © 1642-1343 [employer]. [address] [place] [address], [place] [address]. All rights reserved. This information is not intended as a substitute for professional medical care. Always follow your healthcare professional's instructions.

## 2018-10-26 NOTE — PATIENT INSTRUCTIONS
Shingles  Shingles is a viral infection caused by the same virus as chicken pox. Anyone who has had chicken pox may get shingles later in life. The virus stays in the body, but remains dormant (asleep). Shingles often occurs in older persons or persons with lowered immunity. But it can affect anyone at any age.  Shingles starts as a tingling patch of skin on one side of the body. Small, painful blisters may then appear. The rash does not spread to the rest of the body.  Exposure to shingles cannot cause shingles. However, it can cause chicken pox in anyone who has not had chicken pox or has not been vaccinated. The contagious period ends when all blisters have crusted over (generally about 2 weeks after the illness begins).  After the blisters heal, the affected skin may be sensitive or painful for months (neuralgia). This often gradually goes away.  A shingles vaccine is available. This can help prevent shingles or make it less painful. It is generally recommended for adults over the age of 60 who have had chicken pox in the past, but who have never had shingles. Adults over 60 who have had neither chicken pox nor shingles can prevent both diseases with the chicken pox vaccine. Ask your healthcare provider about these vaccines.  Home care  · Medicines may be prescribed to help relieve pain. Take these medicines as directed. Ask your healthcare provider or pharmacist before using over-the-counter medicines for helping treat pain and itching.  · In certain cases, antiviral medicines may be prescribed to reduce pain, shorten the illness, and prevent neuralgia. Take these medicines as directed.  · Compresses made from a solution of cool water mixed with cornstarch or baking soda may help relieve pain and itching.   · Gently wash skin daily with soap and water to help prevent infection.  Be certain to rinse off all of the soap, which can be irritating.  · Trim fingernails and try not to scratch. Scratching the sores may  leave scars.  · Stay home from work or school until all blisters have formed a crust and you are no longer contagious.  Follow-up care  Follow up with your healthcare provider or as directed by our staff.  When to seek medical advice  · Fever of 100.4°F (38°C) or higher, or as directed by your healthcare provider  · Affected skin is on the face or neck and any of the following occur:  ¨ Headache  ¨ Eye pain  ¨ Changes in vision  ¨ Sores near the eye  ¨ Weakness of facial muscles  · Pain, redness, or swelling of a joint  · Signs of skin infection: colored drainage from the sores, warmth, increasing redness, or increasing pain  Date Last Reviewed: 9/25/2015  © 4126-4958 [employer]. [address] [place] [address], [place] [address]. All rights reserved. This information is not intended as a substitute for professional medical care. Always follow your healthcare professional's instructions.

## 2018-10-29 DIAGNOSIS — E11.9 TYPE 2 DIABETES MELLITUS WITHOUT COMPLICATION, WITHOUT LONG-TERM CURRENT USE OF INSULIN: ICD-10-CM

## 2018-10-29 DIAGNOSIS — I10 ESSENTIAL HYPERTENSION: ICD-10-CM

## 2018-10-30 ENCOUNTER — HOSPITAL ENCOUNTER (OUTPATIENT)
Dept: RADIOLOGY | Facility: HOSPITAL | Age: 43
Discharge: HOME | End: 2018-10-30
Attending: FAMILY MEDICINE
Payer: COMMERCIAL

## 2018-10-30 ENCOUNTER — OFFICE VISIT (OUTPATIENT)
Dept: INTERNAL MEDICINE | Facility: CLINIC | Age: 43
End: 2018-10-30
Payer: COMMERCIAL

## 2018-10-30 DIAGNOSIS — M25.552 LEFT HIP PAIN: ICD-10-CM

## 2018-10-30 DIAGNOSIS — N18.2 HYPERTENSIVE KIDNEY DISEASE WITH STAGE 2 CHRONIC KIDNEY DISEASE: ICD-10-CM

## 2018-10-30 DIAGNOSIS — I51.89 DIASTOLIC DYSFUNCTION WITHOUT HEART FAILURE: Chronic | ICD-10-CM

## 2018-10-30 DIAGNOSIS — N18.2 CKD (CHRONIC KIDNEY DISEASE), STAGE II: Chronic | ICD-10-CM

## 2018-10-30 DIAGNOSIS — E11.9 DM TYPE 2 WITHOUT RETINOPATHY: ICD-10-CM

## 2018-10-30 DIAGNOSIS — I10 ESSENTIAL HYPERTENSION: ICD-10-CM

## 2018-10-30 DIAGNOSIS — M54.42 ACUTE LEFT-SIDED LOW BACK PAIN WITH LEFT-SIDED SCIATICA: Primary | ICD-10-CM

## 2018-10-30 DIAGNOSIS — E66.01 MORBID OBESITY: Chronic | ICD-10-CM

## 2018-10-30 DIAGNOSIS — Q61.2 POLYCYSTIC KIDNEY DISEASE, AUTOSOMAL DOMINANT: Chronic | ICD-10-CM

## 2018-10-30 DIAGNOSIS — E11.9 TYPE 2 DIABETES MELLITUS WITHOUT COMPLICATION, WITHOUT LONG-TERM CURRENT USE OF INSULIN: ICD-10-CM

## 2018-10-30 DIAGNOSIS — E11.21 DIABETIC NEPHROPATHY ASSOCIATED WITH TYPE 2 DIABETES MELLITUS: Chronic | ICD-10-CM

## 2018-10-30 DIAGNOSIS — M54.42 ACUTE LEFT-SIDED LOW BACK PAIN WITH LEFT-SIDED SCIATICA: ICD-10-CM

## 2018-10-30 DIAGNOSIS — Z91.199 NONCOMPLIANCE: ICD-10-CM

## 2018-10-30 DIAGNOSIS — I12.9 HYPERTENSIVE KIDNEY DISEASE WITH STAGE 2 CHRONIC KIDNEY DISEASE: ICD-10-CM

## 2018-10-30 DIAGNOSIS — N52.9 ERECTILE DYSFUNCTION, UNSPECIFIED ERECTILE DYSFUNCTION TYPE: Chronic | ICD-10-CM

## 2018-10-30 PROCEDURE — 73502 X-RAY EXAM HIP UNI 2-3 VIEWS: CPT | Mod: TC,PO,LT

## 2018-10-30 PROCEDURE — 72110 X-RAY EXAM L-2 SPINE 4/>VWS: CPT | Mod: 26,,, | Performed by: RADIOLOGY

## 2018-10-30 PROCEDURE — 73502 X-RAY EXAM HIP UNI 2-3 VIEWS: CPT | Mod: 26,LT,, | Performed by: RADIOLOGY

## 2018-10-30 PROCEDURE — 99214 OFFICE O/P EST MOD 30 MIN: CPT | Mod: S$GLB,,, | Performed by: FAMILY MEDICINE

## 2018-10-30 PROCEDURE — 72110 X-RAY EXAM L-2 SPINE 4/>VWS: CPT | Mod: TC,PO

## 2018-10-30 PROCEDURE — 99999 PR PBB SHADOW E&M-EST. PATIENT-LVL III: CPT | Mod: PBBFAC,,, | Performed by: FAMILY MEDICINE

## 2018-10-30 RX ORDER — LISINOPRIL 40 MG/1
40 TABLET ORAL DAILY
Qty: 90 TABLET | Refills: 1 | Status: SHIPPED | OUTPATIENT
Start: 2018-10-30 | End: 2018-12-03 | Stop reason: SDUPTHER

## 2018-10-30 RX ORDER — LISINOPRIL 40 MG/1
TABLET ORAL
Qty: 90 TABLET | Refills: 3 | OUTPATIENT
Start: 2018-10-30

## 2018-10-30 RX ORDER — ROSUVASTATIN CALCIUM 10 MG/1
10 TABLET, COATED ORAL DAILY
Qty: 90 TABLET | Refills: 1 | Status: SHIPPED | OUTPATIENT
Start: 2018-10-30 | End: 2018-12-03 | Stop reason: SDUPTHER

## 2018-10-30 RX ORDER — CYCLOBENZAPRINE HCL 10 MG
TABLET ORAL
Qty: 30 TABLET | Refills: 0 | Status: SHIPPED | OUTPATIENT
Start: 2018-10-30 | End: 2019-01-10

## 2018-10-30 RX ORDER — METHOCARBAMOL 500 MG/1
500 TABLET, FILM COATED ORAL 4 TIMES DAILY
Qty: 40 TABLET | Refills: 0 | Status: SHIPPED | OUTPATIENT
Start: 2018-10-30 | End: 2018-11-27 | Stop reason: SDUPTHER

## 2018-10-30 RX ORDER — CARVEDILOL 25 MG/1
25 TABLET ORAL 2 TIMES DAILY WITH MEALS
Qty: 180 TABLET | Refills: 1 | Status: SHIPPED | OUTPATIENT
Start: 2018-10-30 | End: 2018-11-17

## 2018-10-30 RX ORDER — DICLOFENAC SODIUM 75 MG/1
75 TABLET, DELAYED RELEASE ORAL 2 TIMES DAILY
Qty: 20 TABLET | Refills: 0 | Status: SHIPPED | OUTPATIENT
Start: 2018-10-30 | End: 2018-12-03

## 2018-10-30 RX ORDER — METFORMIN HYDROCHLORIDE 1000 MG/1
1000 TABLET ORAL 2 TIMES DAILY WITH MEALS
Qty: 180 TABLET | Refills: 1 | Status: SHIPPED | OUTPATIENT
Start: 2018-10-30

## 2018-10-30 RX ORDER — SILDENAFIL 100 MG/1
100 TABLET, FILM COATED ORAL
Qty: 30 TABLET | Refills: 11 | Status: SHIPPED | OUTPATIENT
Start: 2018-10-30 | End: 2020-12-28

## 2018-10-31 VITALS
DIASTOLIC BLOOD PRESSURE: 94 MMHG | BODY MASS INDEX: 44.21 KG/M2 | SYSTOLIC BLOOD PRESSURE: 178 MMHG | WEIGHT: 298.5 LBS | HEART RATE: 74 BPM | HEIGHT: 69 IN | TEMPERATURE: 100 F

## 2018-10-31 PROBLEM — Z91.199 NONCOMPLIANCE: Status: ACTIVE | Noted: 2018-10-31

## 2018-10-31 PROBLEM — E11.9 DM TYPE 2 WITHOUT RETINOPATHY: Chronic | Status: ACTIVE | Noted: 2018-10-08

## 2018-10-31 PROBLEM — Z91.199 NONCOMPLIANCE: Chronic | Status: ACTIVE | Noted: 2018-10-31

## 2018-10-31 PROBLEM — N18.2 HYPERTENSIVE KIDNEY DISEASE WITH STAGE 2 CHRONIC KIDNEY DISEASE: Chronic | Status: ACTIVE | Noted: 2018-09-30

## 2018-10-31 PROBLEM — I12.9 HYPERTENSIVE KIDNEY DISEASE WITH STAGE 2 CHRONIC KIDNEY DISEASE: Chronic | Status: ACTIVE | Noted: 2018-09-30

## 2018-10-31 RX ORDER — ASPIRIN 81 MG/1
81 TABLET ORAL DAILY
COMMUNITY
End: 2022-12-06 | Stop reason: SDUPTHER

## 2018-10-31 NOTE — PROGRESS NOTES
Subjective:   Patient ID: Edgar Patton is a 43 y.o. male.    Chief Complaint: Herpes Zoster and Medication Refill (BP)      HPI  44 yo out of bp med and thinks he has shingles on left flank. Under tx from urgent care for such. Not taking bp med. No systemic symptoms. Denies cp or dyspnea or focal neurological symptoms  No dysuria. Flank pain is superficial and not deep.     Also having some low back pain and left hip pain. No injuries    Patient queried and denies any further complaints.      ALLERGIES AND MEDICATIONS: updated and reviewed.  Review of patient's allergies indicates:  No Known Allergies    Current Outpatient Medications:     aspirin (ECOTRIN) 81 MG EC tablet, Take 81 mg by mouth once daily., Disp: , Rfl:     metFORMIN (GLUCOPHAGE) 1000 MG tablet, Take 1 tablet (1,000 mg total) by mouth 2 (two) times daily with meals., Disp: 180 tablet, Rfl: 1    carvedilol (COREG) 25 MG tablet, Take 1 tablet (25 mg total) by mouth 2 (two) times daily with meals., Disp: 180 tablet, Rfl: 1    cyclobenzaprine (FLEXERIL) 10 MG tablet, 1/2 to 1 po qhs prn severe muscle spasms, Disp: 30 tablet, Rfl: 0    diclofenac (VOLTAREN) 75 MG EC tablet, Take 1 tablet (75 mg total) by mouth 2 (two) times daily. For 5-10 days for pain and inflammation, Disp: 20 tablet, Rfl: 0    latanoprost 0.005 % ophthalmic solution, Place 1 drop into both eyes nightly., Disp: 2.5 mL, Rfl: 6    lisinopril (PRINIVIL,ZESTRIL) 40 MG tablet, Take 1 tablet (40 mg total) by mouth once daily., Disp: 90 tablet, Rfl: 1    methocarbamol (ROBAXIN) 500 MG Tab, Take 1 tablet (500 mg total) by mouth 4 (four) times daily. For muscle spasms for 10 days, Disp: 40 tablet, Rfl: 0    rosuvastatin (CRESTOR) 10 MG tablet, Take 1 tablet (10 mg total) by mouth once daily., Disp: 90 tablet, Rfl: 1    sildenafil (VIAGRA) 100 MG tablet, Take 1 tablet (100 mg total) by mouth as needed for Erectile Dysfunction., Disp: 30 tablet, Rfl: 11    valACYclovir (VALTREX)  "1000 MG tablet, Take 1 tablet (1,000 mg total) by mouth 3 (three) times daily. for 7 days, Disp: 21 tablet, Rfl: 0    Review of Systems   Constitutional: Negative for activity change, appetite change, chills, diaphoresis, fatigue, fever and unexpected weight change.   HENT: Negative for congestion, ear discharge, ear pain, facial swelling, hearing loss, nosebleeds, postnasal drip, rhinorrhea, sinus pressure, sneezing, sore throat, tinnitus, trouble swallowing and voice change.    Eyes: Negative for photophobia, pain, discharge, redness, itching and visual disturbance.   Respiratory: Negative for cough, chest tightness, shortness of breath and wheezing.    Cardiovascular: Negative for chest pain, palpitations and leg swelling.   Gastrointestinal: Negative for abdominal distention, abdominal pain, anal bleeding, blood in stool, constipation, diarrhea, nausea, rectal pain and vomiting.   Endocrine: Negative for cold intolerance, heat intolerance, polydipsia, polyphagia and polyuria.   Genitourinary: Negative for difficulty urinating, dysuria and flank pain.   Musculoskeletal: Positive for back pain. Negative for arthralgias, joint swelling, myalgias and neck pain.   Skin: Negative for rash.   Neurological: Negative for dizziness, tremors, seizures, syncope, speech difficulty, weakness, light-headedness, numbness and headaches.   Psychiatric/Behavioral: Negative for behavioral problems, confusion, decreased concentration, dysphoric mood, sleep disturbance and suicidal ideas. The patient is not nervous/anxious and is not hyperactive.        Objective:     Vitals:    10/30/18 1057   BP: (!) 178/94   Pulse: 74   Temp: 99.6 °F (37.6 °C)   TempSrc: Oral   Weight: 135.4 kg (298 lb 8.1 oz)   Height: 5' 9" (1.753 m)   PainSc:   8     Body mass index is 44.08 kg/m².    Physical Exam   Constitutional: He is oriented to person, place, and time. He appears well-developed and well-nourished. He is cooperative. He does not have a " sickly appearance. No distress.   HENT:   Head: Normocephalic and atraumatic.   Right Ear: Hearing, tympanic membrane, external ear and ear canal normal. No tenderness.   Left Ear: Hearing, tympanic membrane, external ear and ear canal normal. No tenderness.   Nose: Nose normal.   Mouth/Throat: Oropharynx is clear and moist.   Eyes: Conjunctivae and lids are normal. Pupils are equal, round, and reactive to light. Right eye exhibits no discharge. Left eye exhibits no discharge. Right conjunctiva is not injected. Left conjunctiva is not injected. No scleral icterus. Right eye exhibits normal extraocular motion. Left eye exhibits normal extraocular motion.   Neck: Normal range of motion. Neck supple. No JVD present. Carotid bruit is not present. No tracheal deviation and no edema present. No thyromegaly present.   Cardiovascular: Normal rate, regular rhythm, normal heart sounds and normal pulses. Exam reveals no friction rub.   No murmur heard.  Pulmonary/Chest: Effort normal and breath sounds normal. No accessory muscle usage. No respiratory distress. He has no wheezes. He has no rhonchi. He has no rales.   Abdominal: Soft. Bowel sounds are normal. He exhibits no distension, no abdominal bruit, no pulsatile midline mass and no mass. There is no hepatosplenomegaly. There is no tenderness. There is no rebound, no guarding, no CVA tenderness, no tenderness at McBurney's point and negative Ren's sign.   Musculoskeletal: He exhibits no edema.        Left hip: Normal.        Lumbar back: Normal.   Lymphadenopathy:        Head (right side): No submandibular, no preauricular and no posterior auricular adenopathy present.        Head (left side): No submandibular, no preauricular and no posterior auricular adenopathy present.     He has no cervical adenopathy.   Neurological: He is alert and oriented to person, place, and time. GCS eye subscore is 4. GCS verbal subscore is 5. GCS motor subscore is 6.   Skin: Skin is warm  and dry. No ecchymosis and no rash noted. Rash is not maculopapular and not urticarial. He is not diaphoretic. No cyanosis or erythema. Nails show no clubbing.   No rash   Psychiatric: He has a normal mood and affect. His speech is normal and behavior is normal. Thought content normal. His mood appears not anxious. His affect is not angry and not inappropriate. He does not exhibit a depressed mood.   Nursing note and vitals reviewed.      Assessment and Plan:   Edgar was seen today for herpes zoster and medication refill.    Diagnoses and all orders for this visit:    Acute left-sided low back pain with left-sided sciatica  -     X-Ray Lumbar Complete With Flex And Ext; Future  -     Ambulatory consult to Physical Therapy    Left hip pain  -     X-Ray Hip 2 View Left; Future    Essential hypertension  -     lisinopril (PRINIVIL,ZESTRIL) 40 MG tablet; Take 1 tablet (40 mg total) by mouth once daily.  -     carvedilol (COREG) 25 MG tablet; Take 1 tablet (25 mg total) by mouth 2 (two) times daily with meals.    Type 2 diabetes mellitus without complication, without long-term current use of insulin  -     lisinopril (PRINIVIL,ZESTRIL) 40 MG tablet; Take 1 tablet (40 mg total) by mouth once daily.    Noncompliance    Erectile dysfunction, unspecified erectile dysfunction type    Polycystic kidney disease, autosomal dominant    Diastolic dysfunction without heart failure    CKD (chronic kidney disease), stage II    Diabetic nephropathy associated with type 2 diabetes mellitus    Hypertensive kidney disease with stage 2 chronic kidney disease    Morbid obesity    DM type 2 without retinopathy    Other orders  -     cyclobenzaprine (FLEXERIL) 10 MG tablet; 1/2 to 1 po qhs prn severe muscle spasms  -     diclofenac (VOLTAREN) 75 MG EC tablet; Take 1 tablet (75 mg total) by mouth 2 (two) times daily. For 5-10 days for pain and inflammation  -     metFORMIN (GLUCOPHAGE) 1000 MG tablet; Take 1 tablet (1,000 mg total) by  mouth 2 (two) times daily with meals.  -     methocarbamol (ROBAXIN) 500 MG Tab; Take 1 tablet (500 mg total) by mouth 4 (four) times daily. For muscle spasms for 10 days  -     sildenafil (VIAGRA) 100 MG tablet; Take 1 tablet (100 mg total) by mouth as needed for Erectile Dysfunction.  -     rosuvastatin (CRESTOR) 10 MG tablet; Take 1 tablet (10 mg total) by mouth once daily.        Follow-up in about 3 months (around 1/30/2019).    THIS NOTE WILL BE SHARED WITH THE PATIENT.

## 2018-11-06 ENCOUNTER — HOSPITAL ENCOUNTER (EMERGENCY)
Facility: HOSPITAL | Age: 43
Discharge: STILL ADMITTED OR ADMINISTRATIVE | End: 2018-11-06
Attending: FAMILY MEDICINE
Payer: COMMERCIAL

## 2018-11-06 ENCOUNTER — HOSPITAL ENCOUNTER (EMERGENCY)
Facility: HOSPITAL | Age: 43
Discharge: HOME | End: 2018-11-06
Attending: EMERGENCY MEDICINE
Payer: COMMERCIAL

## 2018-11-06 VITALS
HEIGHT: 69 IN | SYSTOLIC BLOOD PRESSURE: 157 MMHG | RESPIRATION RATE: 19 BRPM | BODY MASS INDEX: 43.84 KG/M2 | TEMPERATURE: 98 F | OXYGEN SATURATION: 98 % | DIASTOLIC BLOOD PRESSURE: 74 MMHG | HEART RATE: 75 BPM | WEIGHT: 296 LBS

## 2018-11-06 VITALS
BODY MASS INDEX: 43.71 KG/M2 | WEIGHT: 296 LBS | RESPIRATION RATE: 20 BRPM | DIASTOLIC BLOOD PRESSURE: 97 MMHG | TEMPERATURE: 98 F | SYSTOLIC BLOOD PRESSURE: 193 MMHG | OXYGEN SATURATION: 98 % | HEART RATE: 74 BPM

## 2018-11-06 DIAGNOSIS — I10 ESSENTIAL HYPERTENSION: ICD-10-CM

## 2018-11-06 DIAGNOSIS — R20.2 PARESTHESIA OF LEFT LOWER EXTREMITY: ICD-10-CM

## 2018-11-06 DIAGNOSIS — M48.061 SPINAL STENOSIS OF LUMBAR REGION WITH RADICULOPATHY: Primary | ICD-10-CM

## 2018-11-06 DIAGNOSIS — M54.50 LOW BACK PAIN: Primary | ICD-10-CM

## 2018-11-06 DIAGNOSIS — M54.16 SPINAL STENOSIS OF LUMBAR REGION WITH RADICULOPATHY: Primary | ICD-10-CM

## 2018-11-06 DIAGNOSIS — I10 HYPERTENSION: ICD-10-CM

## 2018-11-06 LAB
ALBUMIN SERPL BCP-MCNC: 4 G/DL
ALP SERPL-CCNC: 96 U/L
ALT SERPL W/O P-5'-P-CCNC: 32 U/L
ANION GAP SERPL CALC-SCNC: 8 MMOL/L
APTT BLDCRRT: 32.6 SEC
AST SERPL-CCNC: 20 U/L
BACTERIA #/AREA URNS AUTO: NORMAL /HPF
BASOPHILS # BLD AUTO: 0.05 K/UL
BASOPHILS NFR BLD: 1 %
BILIRUB SERPL-MCNC: 0.4 MG/DL
BILIRUB UR QL STRIP: NEGATIVE
BUN SERPL-MCNC: 18 MG/DL
CALCIUM SERPL-MCNC: 8.7 MG/DL
CHLORIDE SERPL-SCNC: 105 MMOL/L
CLARITY UR REFRACT.AUTO: CLEAR
CO2 SERPL-SCNC: 25 MMOL/L
COLOR UR AUTO: YELLOW
CREAT SERPL-MCNC: 0.88 MG/DL
DIFFERENTIAL METHOD: ABNORMAL
EOSINOPHIL # BLD AUTO: 0.4 K/UL
EOSINOPHIL NFR BLD: 6.9 %
ERYTHROCYTE [DISTWIDTH] IN BLOOD BY AUTOMATED COUNT: 13.7 %
EST. GFR  (AFRICAN AMERICAN): >60 ML/MIN/1.73 M^2
EST. GFR  (NON AFRICAN AMERICAN): >60 ML/MIN/1.73 M^2
GLUCOSE SERPL-MCNC: 227 MG/DL
GLUCOSE UR QL STRIP: ABNORMAL
HCT VFR BLD AUTO: 39.2 %
HGB BLD-MCNC: 13.2 G/DL
HGB UR QL STRIP: ABNORMAL
HYALINE CASTS UR QL AUTO: 0 /LPF
INR PPP: 1.1
KETONES UR QL STRIP: NEGATIVE
LEUKOCYTE ESTERASE UR QL STRIP: NEGATIVE
LYMPHOCYTES # BLD AUTO: 1.5 K/UL
LYMPHOCYTES NFR BLD: 28.2 %
MCH RBC QN AUTO: 27 PG
MCHC RBC AUTO-ENTMCNC: 33.7 G/DL
MCV RBC AUTO: 80 FL
MICROSCOPIC COMMENT: NORMAL
MONOCYTES # BLD AUTO: 0.3 K/UL
MONOCYTES NFR BLD: 5.3 %
NEUTROPHILS # BLD AUTO: 3.1 K/UL
NEUTROPHILS NFR BLD: 58.6 %
NITRITE UR QL STRIP: NEGATIVE
PH UR STRIP: 5 [PH] (ref 5–8)
PLATELET # BLD AUTO: 249 K/UL
PMV BLD AUTO: 10.2 FL
POTASSIUM SERPL-SCNC: 3.7 MMOL/L
PROT SERPL-MCNC: 7.2 G/DL
PROT UR QL STRIP: ABNORMAL
PROTHROMBIN TIME: 11.7 SEC
RBC # BLD AUTO: 4.88 M/UL
RBC #/AREA URNS AUTO: 2 /HPF (ref 0–4)
SODIUM SERPL-SCNC: 138 MMOL/L
SP GR UR STRIP: 1.02 (ref 1–1.03)
URN SPEC COLLECT METH UR: ABNORMAL
UROBILINOGEN UR STRIP-ACNC: NEGATIVE EU/DL
WBC # BLD AUTO: 5.25 K/UL
WBC #/AREA URNS AUTO: 2 /HPF (ref 0–5)

## 2018-11-06 PROCEDURE — 93005 ELECTROCARDIOGRAM TRACING: CPT

## 2018-11-06 PROCEDURE — 85730 THROMBOPLASTIN TIME PARTIAL: CPT

## 2018-11-06 PROCEDURE — 93010 ELECTROCARDIOGRAM REPORT: CPT | Mod: ,,, | Performed by: INTERNAL MEDICINE

## 2018-11-06 PROCEDURE — 99284 EMERGENCY DEPT VISIT MOD MDM: CPT | Mod: 27

## 2018-11-06 PROCEDURE — 96374 THER/PROPH/DIAG INJ IV PUSH: CPT

## 2018-11-06 PROCEDURE — 80053 COMPREHEN METABOLIC PANEL: CPT

## 2018-11-06 PROCEDURE — 96376 TX/PRO/DX INJ SAME DRUG ADON: CPT

## 2018-11-06 PROCEDURE — 81000 URINALYSIS NONAUTO W/SCOPE: CPT

## 2018-11-06 PROCEDURE — 99285 EMERGENCY DEPT VISIT HI MDM: CPT | Mod: 25

## 2018-11-06 PROCEDURE — 63600175 PHARM REV CODE 636 W HCPCS: Performed by: FAMILY MEDICINE

## 2018-11-06 PROCEDURE — 85610 PROTHROMBIN TIME: CPT

## 2018-11-06 PROCEDURE — 99283 EMERGENCY DEPT VISIT LOW MDM: CPT | Mod: ,,, | Performed by: PHYSICIAN ASSISTANT

## 2018-11-06 PROCEDURE — 85025 COMPLETE CBC W/AUTO DIFF WBC: CPT

## 2018-11-06 PROCEDURE — 25000003 PHARM REV CODE 250: Performed by: FAMILY MEDICINE

## 2018-11-06 PROCEDURE — 25000003 PHARM REV CODE 250: Performed by: PHYSICIAN ASSISTANT

## 2018-11-06 RX ORDER — CLONIDINE HYDROCHLORIDE 0.1 MG/1
0.1 TABLET ORAL
Status: DISCONTINUED | OUTPATIENT
Start: 2018-11-06 | End: 2018-11-06 | Stop reason: HOSPADM

## 2018-11-06 RX ORDER — CLONIDINE HYDROCHLORIDE 0.1 MG/1
0.1 TABLET ORAL
Status: COMPLETED | OUTPATIENT
Start: 2018-11-06 | End: 2018-11-06

## 2018-11-06 RX ORDER — CYCLOBENZAPRINE HCL 10 MG
10 TABLET ORAL 3 TIMES DAILY PRN
Qty: 15 TABLET | Refills: 0 | Status: SHIPPED | OUTPATIENT
Start: 2018-11-06 | End: 2018-11-11

## 2018-11-06 RX ORDER — TRAMADOL HYDROCHLORIDE 50 MG/1
50 TABLET ORAL EVERY 6 HOURS PRN
Qty: 12 TABLET | Refills: 0 | Status: SHIPPED | OUTPATIENT
Start: 2018-11-06 | End: 2018-11-16

## 2018-11-06 RX ORDER — METHYLPREDNISOLONE 4 MG/1
TABLET ORAL
Qty: 1 PACKAGE | Refills: 0 | Status: SHIPPED | OUTPATIENT
Start: 2018-11-06 | End: 2018-11-27

## 2018-11-06 RX ORDER — HYDRALAZINE HYDROCHLORIDE 20 MG/ML
10 INJECTION INTRAMUSCULAR; INTRAVENOUS
Status: COMPLETED | OUTPATIENT
Start: 2018-11-06 | End: 2018-11-06

## 2018-11-06 RX ADMIN — HYDRALAZINE HYDROCHLORIDE 10 MG: 20 INJECTION INTRAMUSCULAR; INTRAVENOUS at 02:11

## 2018-11-06 RX ADMIN — CLONIDINE HYDROCHLORIDE 0.1 MG: 0.1 TABLET ORAL at 02:11

## 2018-11-06 RX ADMIN — CLONIDINE HYDROCHLORIDE 0.1 MG: 0.1 TABLET ORAL at 06:11

## 2018-11-06 NOTE — ED PROVIDER NOTES
Encounter Date: 11/6/2018       History     Chief Complaint   Patient presents with    Leg Pain     c/o right leg pain. diagnosed by pcp with sciatica on left side last week. yesterday started having numbness and weakness to right left and feels like his knee is going to give out on him. denies any injury. ambulatory at triage.    Hypertension     hypertensive at triage. hx of htn. states took his meds this morning.      43-year-old male complains of low back pain started 10 days ago.  Initially started as numbness and progressed to pain radiating to his left lower leg.  He was seen by his PCP and had x-rays which showed possible DJD and narrowing of disc space.  And osteophytes   Patient claims that his numbness has increased to both buttocks and lower legs since yesterday.  Patient also feels his right leg is giving up while he was trying to walk.  No urinary or bowel incontinence or retention.      The history is provided by the patient.     Review of patient's allergies indicates:  No Known Allergies  Past Medical History:   Diagnosis Date    Diabetes mellitus, type 2     Hypertension     Malignant hypertension 3/16/2016     History reviewed. No pertinent surgical history.  Family History   Problem Relation Age of Onset    Hypertension Mother     Diabetes Mother     Polycystic kidney disease Mother     Arthritis Mother     Hypertension Father     Diabetes Father     Polycystic kidney disease Sister     Glaucoma Brother     Amblyopia Neg Hx     Blindness Neg Hx     Cataracts Neg Hx     Macular degeneration Neg Hx     Retinal detachment Neg Hx     Strabismus Neg Hx      Social History     Tobacco Use    Smoking status: Never Smoker    Smokeless tobacco: Never Used   Substance Use Topics    Alcohol use: No     Alcohol/week: 0.0 oz    Drug use: Not on file     Review of Systems   Constitutional: Negative for activity change, appetite change and fever.   HENT: Negative for congestion, ear  discharge, ear pain, sinus pain and sore throat.    Eyes: Negative for pain, discharge and itching.   Respiratory: Negative for cough, shortness of breath and wheezing.    Cardiovascular: Negative for chest pain and palpitations.   Gastrointestinal: Negative for abdominal pain, nausea and vomiting.   Genitourinary: Negative for dysuria, flank pain and frequency.   Musculoskeletal: Positive for back pain and gait problem. Negative for neck pain and neck stiffness.   Skin: Negative for rash.   Neurological: Positive for weakness and numbness. Negative for dizziness, tremors, seizures, syncope, speech difficulty, light-headedness and headaches.   Psychiatric/Behavioral: Negative for behavioral problems, confusion, decreased concentration, dysphoric mood and hallucinations. The patient is not nervous/anxious.        Physical Exam     Initial Vitals [11/06/18 1202]   BP Pulse Resp Temp SpO2   (!) 235/131 82 17 98.8 °F (37.1 °C) 98 %      MAP       --         Physical Exam    Nursing note and vitals reviewed.  Constitutional: He appears well-developed and well-nourished.   HENT:   Head: Normocephalic.   Right Ear: External ear normal.   Left Ear: External ear normal.   Nose: Nose normal.   Mouth/Throat: Oropharynx is clear and moist.   Eyes: Pupils are equal, round, and reactive to light.   Neck: Normal range of motion. Neck supple.   Cardiovascular: Normal rate, regular rhythm, normal heart sounds and intact distal pulses.   Pulmonary/Chest: Breath sounds normal. No respiratory distress. He has no wheezes. He has no rhonchi. He has no rales. He exhibits no tenderness.   Abdominal: Soft. Bowel sounds are normal. He exhibits no distension. There is no tenderness. There is no rebound and no guarding.   Neurological: He is alert and oriented to person, place, and time. He has normal strength. A sensory deficit is present. No cranial nerve deficit. Gait abnormal. Coordination normal. GCS score is 15. GCS eye subscore is 4.  GCS verbal subscore is 5. GCS motor subscore is 6.   Reflex Scores:       Patellar reflexes are 1+ on the right side and 2+ on the left side.       Achilles reflexes are 1+ on the right side and 2+ on the left side.  Bilateral buttocks numbness extending up to bilateral lower legs.  Straight leg raise test on right side is 15° positive  Left side is 45° positive.     Skin: Skin is warm. Capillary refill takes less than 2 seconds. No rash noted. No erythema.   Psychiatric: He has a normal mood and affect. Thought content normal.         ED Course   Critical Care  Date/Time: 11/6/2018 3:45 PM  Performed by: Sergio Díaz MD  Authorized by: Sergio Díaz MD   Direct patient critical care time: 10 minutes  Additional history critical care time: 10 minutes  Ordering / reviewing critical care time: 10 minutes  Documentation critical care time: 10 minutes  Consulting other physicians critical care time: 10 minutes  Consult with family critical care time: 5 minutes  Other critical care time: 5 minutes  Total critical care time (exclusive of procedural time) : 60 minutes  Critical care was necessary to treat or prevent imminent or life-threatening deterioration of the following conditions: CNS failure or compromise.  Critical care was time spent personally by me on the following activities: ordering and review of laboratory studies, obtaining history from patient or surrogate, evaluation of patient's response to treatment, discussions with consultants, examination of patient, ordering and performing treatments and interventions, ordering and review of radiographic studies and re-evaluation of patient's condition.        Labs Reviewed   CBC W/ AUTO DIFFERENTIAL - Abnormal; Notable for the following components:       Result Value    Hemoglobin 13.2 (*)     Hematocrit 39.2 (*)     MCV 80 (*)     All other components within normal limits   COMPREHENSIVE METABOLIC PANEL - Abnormal; Notable for the following components:     Glucose 227 (*)     All other components within normal limits   APTT - Abnormal; Notable for the following components:    aPTT 32.6 (*)     All other components within normal limits   PROTIME-INR   URINALYSIS     EKG Readings: (Independently Interpreted)   Rhythm: Normal Sinus Rhythm. Heart Rate: 75. Ectopy: No Ectopy. Conduction: Normal. ST Segments: Non-Specific ST Segment Depression. T Waves: Normal. Clinical Impression: Normal Sinus Rhythm   Left ventricular hypertrophy       Imaging Results          CT Lumbar Spine Without Contrast (Final result)  Result time 11/06/18 13:37:17    Final result by MARLEY Marc Sr., MD (11/06/18 13:37:17)                 Impression:      1. There is spinal canal stenosis secondary to bridging posterior osteophytes between T10 and L2-3.  At the intervertebral disc space between T11 and T12 the spinal canal has an AP diameter of 3 mm.  At the intervertebral disc space between T12 and L1 the spinal canal as an AP diameter of 3 mm.  This is consistent with the patient's history.  If additional imaging evaluation is clinically indicated, I recommend consideration an MRI examination of the lumbar spine.  2. There are poorly visualized masses scattered throughout both kidneys.  These are characteristic of cysts.  3. There are 4 lumbar-type vertebral bodies. There is a transitional vertebral body between L4 and the sacrum.  4. The above findings and impressions were discussed with Dr. Díaz via the telephone at 13:30 on 11/06/2018.  I observed these findings at 13:25 on 11/06/2018.  All CT scans at this facility use dose modulation, iterative reconstruction, and/or weight base dosing when appropriate to reduce radiation dose when appropriate to reduce radiation dose to as low as reasonably achievable.      Electronically signed by: Lg Marc MD  Date:    11/06/2018  Time:    13:37             Narrative:    EXAMINATION:  CT LUMBAR SPINE WITHOUT CONTRAST    CLINICAL  HISTORY:  Low back pain, cauda equina syndrome suspected; autosomal dominant polycystic renal disease    TECHNIQUE:  Standard lumbar spine CT protocol was performed without IV contrast.    COMPARISON:  A plain film examination of the lumbar spine performed on 10/30/2018. comparison was also made to a CT examination of the abdomen and pelvis performed on 04/10/2016.    FINDINGS:  There are 4 lumbar-type vertebral bodies.  There is a transitional vertebral body between L4 and the sacrum.  There is no fracture, spondylolisthesis, or scoliosis. There is normal lumbar lordosis.  There is spinal canal stenosis secondary to bridging posterior osteophytes between T10 and L2-3.  At the intervertebral disc space between T11 and T12 the spinal canal has an AP diameter of 3 mm.  At the intervertebral disc space between T12 and L1 the spinal canal as an AP diameter of 3 mm.    There are poorly visualized masses scattered throughout both kidneys.                                 Medical Decision Making:   Initial Assessment:   Patient presents with sciatic-like symptoms 10 days ago which have progressed to both sides with numbness.  Slight decrease in deep tendon reflexes on the right side.  Bilateral buttocks numb with lower legs.  Difficulty in moving her right leg, sometimes the knee is giving away.  Right lower leg SLR positive at 15°.    History of hypertensive, high blood pressure on arrival to ED.  Patient says he is very anxious secondary to his symptoms, states he took his medications.  Differential Diagnosis:   Sciatica, disc herniation, osteophyte, spinal canal stenosis, cauda equina  Clinical Tests:   Lab Tests: Ordered and Reviewed  Radiological Study: Ordered and Reviewed  ED Management:  Patient's symptoms are consistent with spinal canal stenosis and are worrisome for progressive neurological symptoms. CT consistent with spinal stenosis and osteophyte.  Patient requires MRI and neurosurgery evaluation.  [facility] request placed for referral center for Neurosurgery.  Other:   I have discussed this case with another health care provider.       <> Summary of the Discussion: Dr. Clemente at [facility] for neurosurgical consult and MRI evaluation  Patient notified regarding the plan and agrees to be transferred to [facility]                      Clinical Impression:   The primary encounter diagnosis was Spinal stenosis of lumbar region with radiculopathy. A diagnosis of Essential hypertension was also pertinent to this visit.      Disposition:   Disposition: Transferred  Condition: Serious                        Sergio Díaz MD  11/06/18 0213       Sergio Díaz MD  11/23/18 [id number]

## 2018-11-06 NOTE — ED NOTES
LOC: The patient is awake, alert and aware of environment with an appropriate affect, the patient is oriented x 3 and speaking appropriately. PERRLA present. Patient with symmetrical facial expression and equal strength in bilateral hands.   APPEARANCE: Patient resting comfortably and in no acute distress, patient is clean and well groomed, patient's clothing is properly fastened.  SKIN: The skin is warm and dry, patient has normal skin turgor and moist mucus membranes, skin intact, no breakdown or brusing noted.  MUSCULOSKELETAL: Patient moving all extremities well, no obvious swelling or deformities noted. Patient reports weakness to RLE since yesterday. Patient reports pain with movement. Patient also with numbness present to BLE.   RESPIRATORY: Airway is open and patent, respirations are spontaneous, patient has a normal effort and rate. Breath sounds are clear and equal bilaterally. Denies cough.   CARDIAC: Normal heart sounds. No peripheral edema. Denies chest pain or SOB.   ABDOMEN: Soft and non tender to palpation, no distention noted. Bowel sounds present. Denies NVD.

## 2018-11-06 NOTE — ED PROVIDER NOTES
Encounter Date: 11/6/2018       History     Chief Complaint   Patient presents with    [facility] transfer     lumbar compression; leg weakness     Patient is a 43 year old male with PMHX of HTN, HLD, DM2, CKD stage 2, and ED. He presents to the ED for back pain. Patient was transferred from [facility] ED for emergent MRI for back pain. He reports having low back pain for approximately 10 days. Describes pain as constant and aching. Rates pain 0/10. Reports increased paresthesias of lower extremities occurring over the last three days. Reports associated paresthesias of b/l lower extremities, right lower extremity weakness, and gait abnormality. Denies recent falls or trauma. Denies hx of anticoagulation. Denies urinary/fecal incontinence. He denies fever,chills, nausea, vomiting, sob, chest pain, abd pain, dysuria, diarrhea, or constipation. He is a non smoker and denies alcohol use.          Review of patient's allergies indicates:  No Known Allergies  Past Medical History:   Diagnosis Date    Diabetes mellitus, type 2     Hypertension     Malignant hypertension 3/16/2016     History reviewed. No pertinent surgical history.  Family History   Problem Relation Age of Onset    Hypertension Mother     Diabetes Mother     Polycystic kidney disease Mother     Arthritis Mother     Hypertension Father     Diabetes Father     Polycystic kidney disease Sister     Glaucoma Brother     Amblyopia Neg Hx     Blindness Neg Hx     Cataracts Neg Hx     Macular degeneration Neg Hx     Retinal detachment Neg Hx     Strabismus Neg Hx      Social History     Tobacco Use    Smoking status: Never Smoker    Smokeless tobacco: Never Used   Substance Use Topics    Alcohol use: No     Alcohol/week: 0.0 oz    Drug use: Not on file     Review of Systems   Constitutional: Negative for fever.   HENT: Negative for sore throat.    Respiratory: Negative for shortness of breath.    Cardiovascular: Negative for chest  pain.   Gastrointestinal: Negative for abdominal pain, nausea and vomiting.   Genitourinary: Negative for dysuria.   Musculoskeletal: Positive for back pain and gait problem.   Skin: Negative for rash.   Neurological: Positive for weakness and numbness.   Hematological: Does not bruise/bleed easily.       Physical Exam     Initial Vitals [11/06/18 1640]   BP Pulse Resp Temp SpO2   (!) 189/89 82 16 98.1 °F (36.7 °C) 98 %      MAP       --         Physical Exam    Vitals reviewed.  Constitutional: He appears well-developed and well-nourished. No distress.   Patient resting comfortably in NAD on RA.    HENT:   Head: Normocephalic.   Eyes: Conjunctivae and EOM are normal. Pupils are equal, round, and reactive to light.   Neck: Normal range of motion.   Cardiovascular: Normal rate and regular rhythm.   No murmur heard.  Pulmonary/Chest: Breath sounds normal. No respiratory distress. He has no wheezes. He has no rales.   Abdominal: Soft. Bowel sounds are normal. He exhibits no distension. There is no tenderness.   Musculoskeletal: Normal range of motion.   No midline spinal tenderness.    Neurological: He is alert and oriented to person, place, and time. He has normal strength. A sensory deficit (over left lower extremity) is present. Gait normal.   Reflex Scores:       Patellar reflexes are 2+ on the right side and 2+ on the left side.       Achilles reflexes are 2+ on the right side and 2+ on the left side.  Motor strength of b/l UE and LE 5/5.   Skin: Skin is warm and dry. No erythema.         ED Course   Procedures  Labs Reviewed - No data to display                APC / Resident Notes:   Patient is a 43 year old male presents to the ED for emergent evaluation of low back pain.     Will order imaging. Will continue to monitor.     Differential diagnoses include, but are not limited to: cauda equina, spinal epidural abscess, lumbar strain, sciatica, sacroiliac joint dysfunction, and lumbar degenerative disk  disease.    6:19 PM  Case signed out to oncoming attending, Dr. Lopez, pending hypertensive management and MRI thoracic and lumbar spine. Anticipate discharge home.     I have discussed and reviewed with my supervising physician.        Clinical Impression:   Diagnoses of Paresthesia of left lower extremity and Low back pain were pertinent to this visit.                             Stacey Milian PA-C  11/06/18 1821

## 2018-11-06 NOTE — ED TRIAGE NOTES
Patient presents as transfer from [facility] for MRI. Patient reports that he was diagnosed with sciatica last week. Patient states that he was having left sided back pain with numbness in left leg. Patient reports yesterday he developed weakness in RLE. Patient reports numbness to BLE today as well. Patient is A&Ox4 and following commands.

## 2018-11-07 ENCOUNTER — PATIENT MESSAGE (OUTPATIENT)
Dept: NEUROLOGY | Facility: CLINIC | Age: 43
End: 2018-11-07

## 2018-11-07 ENCOUNTER — OFFICE VISIT (OUTPATIENT)
Dept: NEUROLOGY | Facility: CLINIC | Age: 43
End: 2018-11-07
Payer: COMMERCIAL

## 2018-11-07 VITALS
WEIGHT: 291 LBS | SYSTOLIC BLOOD PRESSURE: 180 MMHG | HEIGHT: 69 IN | HEART RATE: 67 BPM | DIASTOLIC BLOOD PRESSURE: 106 MMHG | BODY MASS INDEX: 43.1 KG/M2

## 2018-11-07 DIAGNOSIS — R29.898 WEAKNESS OF RIGHT LOWER EXTREMITY: Primary | ICD-10-CM

## 2018-11-07 DIAGNOSIS — R20.0 LOWER EXTREMITY NUMBNESS: ICD-10-CM

## 2018-11-07 PROCEDURE — 99999 PR PBB SHADOW E&M-EST. PATIENT-LVL IV: CPT | Mod: PBBFAC,,, | Performed by: STUDENT IN AN ORGANIZED HEALTH CARE EDUCATION/TRAINING PROGRAM

## 2018-11-07 PROCEDURE — 99204 OFFICE O/P NEW MOD 45 MIN: CPT | Mod: S$GLB,,, | Performed by: PSYCHIATRY & NEUROLOGY

## 2018-11-07 NOTE — ED NOTES
Patient resting in stretcher with NAD noted. Following commands, and speech clear and appropriate. All belongings within reach. Patient denies any pain at this time. Patient updated on plan of care and all questions addressed. Safety precautions remain in place. Patient notified that wait for MRI will be around ~30-45 minutes.

## 2018-11-07 NOTE — ED NOTES
All medications and discharge information, prescriptions and paperwork instructed and handed to patient, all questions were answered and he verbalizes positive understanding. Patient AAOx4, VSS, no acute distress reported or observed.  All LDAs removed, intact, site WNL, no redness or edema observed. Patient dressed himself independently. Patient ambulated to ED waiting area independently with family and all belongings.

## 2018-11-07 NOTE — PROVIDER PROGRESS NOTES - EMERGENCY DEPT.
Encounter Date: 11/6/2018    ED Physician Progress Notes        Physician Note:   The patient was unable to have the MRI performed because of anxiety and claustrophobia.  I did offer to give the patient some medicine to help calm his nerves, but he states that at this point in time he would prefer to have this test scheduled on an outpatient basis.  The patient denies any bowel or bladder incontinence, lower extremity weakness and during a repeat physical exam does not have any saddle anesthesia and has 2+ DTRs in bilateral patella and Achilles exams.  The patient is also being given neurosurgery follow-up was told to seek immediate medical attention for any acute negative worsening symptoms.  The patient verbalized understanding of this medical plan and agreed

## 2018-11-07 NOTE — ED NOTES
MARGARITA Milian notified of patient blood pressure. Patient with hx of HTN. Patient is asymptomatic at this time.

## 2018-11-07 NOTE — PROGRESS NOTES
"Neurology Clinic  Initial Consult    Patient Name: Edgar Patton  MRN: [id number]    CC:     HPI: Edgar Patton is a 43 y.o. male w/ PMH significant for DMII (on metformin), HTN (coreg, lisinopril) , HLD presenting as referral from ED for evaluation of acute onset LE weakness and numbness.  Patient states that he started to experience L sided LE numbness that originated at his hip and encompassed the area down to his  knee started about 6-7 days ago. 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 He reports working his usual 12 hour shifts that day, but does state that  the river was more "choppy" and this might have set off the symptoms. States that he started to notice the numbness right after his shift.  Reports that he also noted R leg involvement as well about 3-4 days PTA, primarily weakness, feels like the leg might give out.  Waxing and waning symptoms, provoked by walking. Laying down resolves the issue. No pain at all in either of the lower extremities reported.    L sided lumbar back pain started the same day as the L leg.  No previous symptoms.   Normal bowel movements, no issues.   Heating pads have been helping as well as turning on the car seat warmer.   Has not gone back to work since the symptom onset. No falls prior and no falls after the symptoms starting.  Patient was transferred from [facility] ED for emergent MRI for back pain to [facility] ED on 11/06, but per patient's reports and ED documentation, patient was unable to have the MRI performed because of anxiety and claustrophobia. He was offered  medicine to help calm his nerves, but he states that at this point in time he would prefer to have this test scheduled on an outpatient basis.  Denies any new onset saddle anesthesia, bladder or bowel incontinence since his ED visit.   Patient also reports non-compliance with all of his medication. " Denies side effects, just states that he doesn't take the medication every day as prescribed.         Review of Systems:  General: No fevers, chills  Eyes: No changes in vision  ENT: No changes in hearing  Respiratory: No SOB  CV: No chest pain, palpitations  GI: No diarrhea, blood in stool  Urinary: No dysuria, hematuria  Skin: No rashes  Neurological: RLE weakness, no confusion  Psychiatric: No auditory nor visual hallucinations      Past Medical History  Past Medical History:   Diagnosis Date    Diabetes mellitus, type 2     Hypertension     Malignant hypertension 3/16/2016       Medications    Current Outpatient Medications:     aspirin (ECOTRIN) 81 MG EC tablet, Take 81 mg by mouth once daily., Disp: , Rfl:     carvedilol (COREG) 25 MG tablet, Take 1 tablet (25 mg total) by mouth 2 (two) times daily with meals., Disp: 180 tablet, Rfl: 1    cyclobenzaprine (FLEXERIL) 10 MG tablet, 1/2 to 1 po qhs prn severe muscle spasms, Disp: 30 tablet, Rfl: 0    cyclobenzaprine (FLEXERIL) 10 MG tablet, Take 1 tablet (10 mg total) by mouth 3 (three) times daily as needed for Muscle spasms., Disp: 15 tablet, Rfl: 0    diclofenac (VOLTAREN) 75 MG EC tablet, Take 1 tablet (75 mg total) by mouth 2 (two) times daily. For 5-10 days for pain and inflammation, Disp: 20 tablet, Rfl: 0    latanoprost 0.005 % ophthalmic solution, Place 1 drop into both eyes nightly., Disp: 2.5 mL, Rfl: 6    lisinopril (PRINIVIL,ZESTRIL) 40 MG tablet, Take 1 tablet (40 mg total) by mouth once daily., Disp: 90 tablet, Rfl: 1    metFORMIN (GLUCOPHAGE) 1000 MG tablet, Take 1 tablet (1,000 mg total) by mouth 2 (two) times daily with meals., Disp: 180 tablet, Rfl: 1    methocarbamol (ROBAXIN) 500 MG Tab, Take 1 tablet (500 mg total) by mouth 4 (four) times daily. For muscle spasms for 10 days, Disp: 40 tablet, Rfl: 0    methylPREDNISolone (MEDROL DOSEPACK) 4 mg tablet, Take as prescribed, Disp: 1 Package, Rfl: 0    rosuvastatin (CRESTOR) 10 MG  tablet, Take 1 tablet (10 mg total) by mouth once daily., Disp: 90 tablet, Rfl: 1    sildenafil (VIAGRA) 100 MG tablet, Take 1 tablet (100 mg total) by mouth as needed for Erectile Dysfunction., Disp: 30 tablet, Rfl: 11    traMADol (ULTRAM) 50 mg tablet, Take 1 tablet (50 mg total) by mouth every 6 (six) hours as needed., Disp: 12 tablet, Rfl: 0    valACYclovir (VALTREX) 1000 MG tablet, Take 1 tablet (1,000 mg total) by mouth 3 (three) times daily. for 7 days, Disp: 21 tablet, Rfl: 0  Any other notable medications as documented in HPI    Allergies  Review of patient's allergies indicates:  No Known Allergies    Social History  Social History     Socioeconomic History    Marital status: Single     Spouse name: Not on file    Number of children: 2    Years of education: Not on file    Highest education level: Not on file   Social Needs    Financial resource strain: Not on file    Food insecurity - worry: Not on file    Food insecurity - inability: Not on file    Transportation needs - medical: Not on file    Transportation needs - non-medical: Not on file   Occupational History    Occupation: employed   Tobacco Use    Smoking status: Never Smoker    Smokeless tobacco: Never Used   Substance and Sexual Activity    Alcohol use: No     Alcohol/week: 0.0 oz    Drug use: Not on file    Sexual activity: Not on file   Other Topics Concern    Not on file   Social History Narrative    Not on file     Any other notable Social History as documented in HPI.    Family History  Family History   Problem Relation Age of Onset    Hypertension Mother     Diabetes Mother     Polycystic kidney disease Mother     Arthritis Mother     Hypertension Father     Diabetes Father     Polycystic kidney disease Sister     Glaucoma Brother     Amblyopia Neg Hx     Blindness Neg Hx     Cataracts Neg Hx     Macular degeneration Neg Hx     Retinal detachment Neg Hx     Strabismus Neg Hx      Any other notable FMH as  "documented in HPI.    Physical Exam  BP (!) 180/106   Pulse 67   Ht 5' 9" (1.753 m)   Wt 132 kg (291 lb 0.1 oz)   BMI 42.97 kg/m²     General: Well-developed, well-groomed. No apparent distress  HENT: Normocephalic, noted old, well healed scar on R side of forehead  Cardiovascular: Regular rate and rhythm with no murmurs, rubs or gallops.    Chest: Lungs clear to auscultation bilaterally.  No wheezes, stridor, ronchi appreciated.  Abdomen: Normoactive bowel sounds present.  Soft, nontender to palpation.  Musculoskeletal: No peripheral edema    Neurologic Exam: The patient is awake, alert and oriented. Language is fluent.  Fund of knowledge is appropriate.     Cranial nerves:   Pupils are round and reactive to light and accommodation.   Visual fields are full to confrontation.    Ocular motility is full in all cardinal positions of gaze.   Facial sensation is normal to light touch.   Facial activation is symmetric.   Hearing is symmetric bilaterally.   Palate elevates symmetrically.   Shoulder elevation is symmetric and full strength bilaterally.   Tongue is midline and neck rotation strength is normal bilaterally.     Motor examination of all extremities demonstrates normal bulk and tone in all four limbs. There are no atrophy or fasciculations. Strength is 5/5 in the upper, 5/5 in LLE throughout. Noted proximal weakness in RLE - hip flexors 4/5, knee flexion/extension 5/5, plantarflexion/dorsiflexion 5/5.    Sensory examination is decreased in the upper thigh of LLE.  Romberg is negative.  Sensation to light touch: as above  Sensation to temperature: intact in BUE and BLE  Sensation to vibration: intact in BUE and BLE  Sensation to pinprick: intact in BUE and BLE    Deep tendon reflexes are 2+ and symmetric in the upper and lower extremities bilaterally.  No clonus, downgoing toes b/l.    Gait: Normal tandem,noted an antalgic gait     Coordination: Finger to nose and heel to shin testing is normal in both upper " and lower extremities.    Lab and Test Results    WBC   Date Value Ref Range Status   11/06/2018 5.25 3.90 - 12.70 K/uL Final   08/20/2018 5.78 3.90 - 12.70 K/uL Final   09/06/2016 6.27 3.90 - 12.70 K/uL Final     Hemoglobin   Date Value Ref Range Status   11/06/2018 13.2 (L) 14.0 - 18.0 g/dL Final   08/20/2018 13.4 (L) 14.0 - 18.0 g/dL Final   09/06/2016 12.7 (L) 14.0 - 18.0 g/dL Final     Hematocrit   Date Value Ref Range Status   11/06/2018 39.2 (L) 40.0 - 54.0 % Final   08/20/2018 39.9 (L) 40.0 - 54.0 % Final   09/06/2016 35.9 (L) 40.0 - 54.0 % Final     Platelets   Date Value Ref Range Status   11/06/2018 249 150 - 350 K/uL Final   08/20/2018 261 150 - 350 K/uL Final   09/06/2016 287 150 - 350 K/uL Final     Glucose   Date Value Ref Range Status   11/06/2018 227 (H) 70 - 110 mg/dL Final   08/20/2018 270 (H) 70 - 110 mg/dL Final   09/06/2016 151 (H) 70 - 110 mg/dL Final     Sodium   Date Value Ref Range Status   11/06/2018 138 136 - 145 mmol/L Final   08/20/2018 136 136 - 145 mmol/L Final   09/06/2016 139 136 - 145 mmol/L Final     Potassium   Date Value Ref Range Status   11/06/2018 3.7 3.5 - 5.1 mmol/L Final   08/20/2018 3.9 3.5 - 5.1 mmol/L Final   09/06/2016 3.4 (L) 3.5 - 5.1 mmol/L Final     Chloride   Date Value Ref Range Status   11/06/2018 105 95 - 110 mmol/L Final   08/20/2018 103 95 - 110 mmol/L Final   09/06/2016 107 95 - 110 mmol/L Final     CO2   Date Value Ref Range Status   11/06/2018 25 23 - 29 mmol/L Final   08/20/2018 25 23 - 29 mmol/L Final   09/06/2016 24 23 - 29 mmol/L Final     BUN, Bld   Date Value Ref Range Status   11/06/2018 18 2 - 20 mg/dL Final   08/20/2018 17 6 - 20 mg/dL Final   09/06/2016 16 6 - 20 mg/dL Final     Creatinine   Date Value Ref Range Status   11/06/2018 0.88 0.50 - 1.40 mg/dL Final   08/20/2018 1.1 0.5 - 1.4 mg/dL Final   09/06/2016 1.2 0.5 - 1.4 mg/dL Final     Calcium   Date Value Ref Range Status   11/06/2018 8.7 8.7 - 10.5 mg/dL Final   08/20/2018 9.1 8.7 - 10.5  mg/dL Final   09/06/2016 9.0 8.7 - 10.5 mg/dL Final     Magnesium   Date Value Ref Range Status   04/11/2016 1.9 1.6 - 2.6 mg/dL Final     Phosphorus   Date Value Ref Range Status   09/06/2016 3.3 2.7 - 4.5 mg/dL Final   04/11/2016 4.3 2.7 - 4.5 mg/dL Final     Alkaline Phosphatase   Date Value Ref Range Status   11/06/2018 96 38 - 126 U/L Final   08/20/2018 126 55 - 135 U/L Final   04/10/2016 133 55 - 135 U/L Final     ALT   Date Value Ref Range Status   11/06/2018 32 10 - 44 U/L Final   08/20/2018 30 10 - 44 U/L Final   04/10/2016 50 (H) 10 - 44 U/L Final     AST   Date Value Ref Range Status   11/06/2018 20 15 - 46 U/L Final   08/20/2018 17 10 - 40 U/L Final   04/10/2016 30 10 - 40 U/L Final         Images:  CT Lumbar spine 11/06/2018  There are 4 lumbar-type vertebral bodies.  There is a transitional vertebral body between L4 and the sacrum.  There is no fracture, spondylolisthesis, or scoliosis. There is normal lumbar lordosis.  There is spinal canal stenosis secondary to bridging posterior osteophytes between T10 and L2-3.  At the intervertebral disc space between T11 and T12 the spinal canal has an AP diameter of 3 mm.  At the intervertebral disc space between T12 and L1 the spinal canal as an AP diameter of 3 mm.  There are poorly visualized masses scattered throughout both kidneys.  Impression   1. There is spinal canal stenosis secondary to bridging posterior osteophytes between T10 and L2-3.  At the intervertebral disc space between T11 and T12 the spinal canal has an AP diameter of 3 mm.  At the intervertebral disc space between T12 and L1 the spinal canal as an AP diameter of 3 mm.  This is consistent with the patient's history.  If additional imaging evaluation is clinically indicated, I recommend consideration an MRI examination of the lumbar spine.  2. There are poorly visualized masses scattered throughout both kidneys.  These are characteristic of cysts.  3. There are 4 lumbar-type vertebral  bodies. There is a transitional vertebral body between L4 and the sacrum          Assessment and Plan    Problem List Items Addressed This Visit        Orthopedic    Lower extremity weakness - Primary    Current Assessment & Plan     Patient w/ above mentioned PMHx presents to the office w/ subacute onset of LLE numbness and RLE weakness. Patient was transferred to the [facility] ED on 11/06 for emergent imaging in the setting fo new symptoms from OSH, but did not undergo the necessary imaging 2/2 anxiety and claustrophobia. Patient did not agree to sedative medication at the time to allow for tolerance of the exam and instead opted for an outpatient MRI option. Today he presents to the office with similar complaints, and denies any worsening of symptoms    - Recommendation remain to obtain an MRI L spine w/ and w.o contrast to assess the spinal canal for any compression. Strong suspicion for herniated disk in the setting of provided history  - Patient would prefer to go to an open MRI and an external referral was given  - Aware that we will not be privy to review the MRI until he is able to get the study on a disk and to provide the copy for evaluation  - Aware that if the symptoms worsen, patient should go to the ED immediately  - Follow up after the study is complete and available for review  - Symptomatic treatment w/ heat pack application and muscle relaxant can be continues, as patient reports relief with the above mentioned therapies          Relevant Orders    MRI Lumbar Spine W WO Contrast       Other    Lower extremity numbness  - See above     Relevant Orders    MRI Lumbar Spine W WO Contrast            Jayda Harvey MD  Neurology Resident   [facility]  [address] [place] [address]

## 2018-11-08 ENCOUNTER — TELEPHONE (OUTPATIENT)
Dept: NEUROLOGY | Facility: CLINIC | Age: 43
End: 2018-11-08

## 2018-11-08 NOTE — TELEPHONE ENCOUNTER
----- Message from Savanna Mann sent at 11/8/2018  3:08 PM CST -----  Contact: Pt. [phone]  Needs Advice    Reason for call:The patient would like to speak to someone regarding his MRI.  Please contact the patient to discuss further.          Communication Preference:PHONE     Additional Information:

## 2018-11-08 NOTE — TELEPHONE ENCOUNTER
Spoke to the patient to let him know I can fax his MRI to the provided number. Also explained to the patient that the MRI has not been approved by his insurance yet.

## 2018-11-08 NOTE — TELEPHONE ENCOUNTER
Pt called stating dr swartz ordered MRI  Lumbar with contrast. Patient a little difficult to understand but says he is paying out of pocket due to issues with insurance being able to cover imaging.  Patient would like to know if contrast is absolutely necessary and if it would be just as useful without contrast. Patient would like a return back. Please advise

## 2018-11-08 NOTE — TELEPHONE ENCOUNTER
----- Message from Chiara Moffett sent at 11/8/2018  8:09 AM CST -----  Contact: Mignon (wife) @ [phone]  Calling to speak with someone regarding the open MRI the patient is scheduled today @4pm but they are in need of the orders getting faxed over to fax# [phone]. Asking to have done prior to the appt. Please call.

## 2018-11-09 NOTE — ASSESSMENT & PLAN NOTE
Patient w/ above mentioned PMHx presents to the office w/ subacute onset of LLE numbness and RLE weakness. Patient was transferred to the [facility] ED on 11/06 for emergent imaging in the setting fo new symptoms from OSH, but did not undergo the necessary imaging 2/2 anxiety and claustrophobia. Patient did not agree to sedative medication at the time to allow for tolerance of the exam and instead opted for an outpatient MRI option. Today he presents to the office with similar complaints, and denies any worsening of symptoms    - Recommendation remain to obtain an MRI L spine w/ and w.o contrast to assess the spinal canal for any compression. Strong suspicion for herniated disk in the setting of provided history  - Patient would prefer to go to an open MRI and an external referral was given  - Aware that we will not be privy to review the MRI until he is able to get the study on a disk and to provide the copy for evaluation  - Aware that if the symptoms worsen, patient should go to the ED immediately  - Follow up after the study is complete and available for review  - Symptomatic treatment w/ heat pack application and muscle relaxant can be continues, as patient reports relief with the above mentioned therapies

## 2018-11-12 ENCOUNTER — TELEPHONE (OUTPATIENT)
Dept: NEUROLOGY | Facility: HOSPITAL | Age: 43
End: 2018-11-12

## 2018-11-12 ENCOUNTER — TELEPHONE (OUTPATIENT)
Dept: NEUROLOGY | Facility: CLINIC | Age: 43
End: 2018-11-12

## 2018-11-12 NOTE — TELEPHONE ENCOUNTER
----- Message from Chiara Moffett sent at 11/9/2018 10:38 AM CST -----  Needs Advice    Reason for call:calling to speak with someone regarding his MRI, asking if it can be ordered without contrast.         Communication Preference:pt @ [phone]    Additional Information:

## 2018-11-12 NOTE — TELEPHONE ENCOUNTER
----- Message from Chiara Moffett sent at 11/9/2018 12:49 PM CST -----  Needs Advice    Reason for call:calling to schedule his MRI as soon as possible , says he is loosing feeling in his legs. Please call        Communication Preference:pt@ [phone]    Additional Information:

## 2018-11-12 NOTE — TELEPHONE ENCOUNTER
Spoke to patient on the phone regarding his MRI, as he reportedly had some questions. States he wanted to know where to drop off the disk with the images, once available and how quickly it would be reviewed.   Informed patient that depending on the jammie the disk is dropped off, images will likely be review the same vs.next day.   Patient w.o any further questions. States that the MRI has been scheduled for Wednesday.   Jayda Harvey MD

## 2018-11-14 ENCOUNTER — PATIENT MESSAGE (OUTPATIENT)
Dept: NEUROLOGY | Facility: CLINIC | Age: 43
End: 2018-11-14

## 2018-11-14 ENCOUNTER — TELEPHONE (OUTPATIENT)
Dept: NEUROLOGY | Facility: CLINIC | Age: 43
End: 2018-11-14

## 2018-11-14 DIAGNOSIS — M48.061 DEGENERATIVE LUMBAR SPINAL STENOSIS: Primary | ICD-10-CM

## 2018-11-14 DIAGNOSIS — Q76.49 CONGENITAL STENOSIS OF LUMBAR SPINE: ICD-10-CM

## 2018-11-14 NOTE — TELEPHONE ENCOUNTER
Spoke to patient over the phone after reviewing the MRI lumbar spine w/ and w/o contrast from the outside facility w/ radiology attending. Described the developmental spinal stenosis and degenerative disk disease noted on the MRI as well as severe stenosis of the cord and compression of the conus medullaris at T12-L1, L1-L2, L3-L4, as well as moderate stenosis at L3-L4. Patient aware that referral was sent to Neurosurgery and I will personally follow up tomorrow in order to have patient be seen in clinic as soon as an appointment is available.   Patient asked if he is able to travel, which he will be doing the day after tomorrow. Did address the need to follow up w/ neurosurgery sooner rather than later, but that there are no over contraindications to air travel at this time as the abnormalities above appear to be chronic rather than acute in nature.   Patient also asked about PT that would be provided by his sister, who is a trained PT. Talked about the utility of PT and how regardless of therapy, patient should follow up w/ Neurosurgery.  Unable to get in contact w/ patient's brother as requested, as no one answered at the provided number.   Jayda Harvey MD  PGY-2   Neurology Department   [facility]

## 2018-11-14 NOTE — TELEPHONE ENCOUNTER
----- Message from Ronan Syed sent at 11/14/2018  4:02 PM CST -----  Needs Advice    Reason for call: Pt states he dropped off his MRI CD today, and he's asking the doctor call his brother, who is also a doctor, to discuss the results of the MRI        Communication Preference: Dr. Filiberto Rollins (brother) @ [phone]    Additional Information:

## 2018-11-14 NOTE — TELEPHONE ENCOUNTER
----- Message from Savanna Mann sent at 11/14/2018  9:46 AM CST -----  Contact: Brinda ([facility]) [phone]  Brinda called to speak to someone regaridng the patient's contrast. Please contact her as soon as possible

## 2018-11-15 ENCOUNTER — TELEPHONE (OUTPATIENT)
Dept: NEUROLOGY | Facility: CLINIC | Age: 43
End: 2018-11-15

## 2018-11-17 ENCOUNTER — NURSE TRIAGE (OUTPATIENT)
Dept: ADMINISTRATIVE | Facility: CLINIC | Age: 43
End: 2018-11-17

## 2018-11-17 DIAGNOSIS — I10 ESSENTIAL HYPERTENSION: ICD-10-CM

## 2018-11-17 RX ORDER — CARVEDILOL 25 MG/1
25 TABLET ORAL 2 TIMES DAILY WITH MEALS
Qty: 180 TABLET | Refills: 1 | Status: SHIPPED | OUTPATIENT
Start: 2018-11-17 | End: 2018-12-03 | Stop reason: SDUPTHER

## 2018-11-17 NOTE — TELEPHONE ENCOUNTER
high blood pressure medication. Pt called re traveling now. Needs coreg since it was misplaced on plane. Spoke with Jennifer at  [facility] in [facility] - refill remaining on file   [facility] [phone]    Reason for Disposition   Caller has medication question about med not prescribed by PCP and triager unable to answer question (e.g., compatibility with other med, storage)    Protocols used:  MEDICATION QUESTION CALL-A-  called in at 1228 pm to osorio   Pt notified. Call back with questions.

## 2018-11-27 RX ORDER — METHOCARBAMOL 500 MG/1
TABLET, FILM COATED ORAL
Qty: 40 TABLET | Refills: 0 | Status: SHIPPED | OUTPATIENT
Start: 2018-11-27 | End: 2018-12-28 | Stop reason: SDUPTHER

## 2018-11-28 ENCOUNTER — TELEPHONE (OUTPATIENT)
Dept: NEUROSURGERY | Facility: CLINIC | Age: 43
End: 2018-11-28

## 2018-11-28 ENCOUNTER — TELEPHONE (OUTPATIENT)
Dept: NEUROLOGY | Facility: CLINIC | Age: 43
End: 2018-11-28

## 2018-11-28 ENCOUNTER — OFFICE VISIT (OUTPATIENT)
Dept: NEUROSURGERY | Facility: CLINIC | Age: 43
End: 2018-11-28
Payer: COMMERCIAL

## 2018-11-28 VITALS
BODY MASS INDEX: 42.97 KG/M2 | WEIGHT: 291 LBS | TEMPERATURE: 99 F | HEART RATE: 72 BPM | DIASTOLIC BLOOD PRESSURE: 111 MMHG | SYSTOLIC BLOOD PRESSURE: 202 MMHG

## 2018-11-28 DIAGNOSIS — M51.04 HNP (HERNIATED NUCLEUS PULPOSUS WITH MYELOPATHY), THORACIC: Primary | ICD-10-CM

## 2018-11-28 DIAGNOSIS — R20.0 LOWER EXTREMITY NUMBNESS: ICD-10-CM

## 2018-11-28 DIAGNOSIS — N18.2 HYPERTENSIVE KIDNEY DISEASE WITH STAGE 2 CHRONIC KIDNEY DISEASE: ICD-10-CM

## 2018-11-28 DIAGNOSIS — I12.9 HYPERTENSIVE KIDNEY DISEASE WITH STAGE 2 CHRONIC KIDNEY DISEASE: ICD-10-CM

## 2018-11-28 DIAGNOSIS — R29.898 WEAKNESS OF RIGHT LOWER EXTREMITY: Primary | ICD-10-CM

## 2018-11-28 DIAGNOSIS — E11.21 DIABETIC NEPHROPATHY ASSOCIATED WITH TYPE 2 DIABETES MELLITUS: ICD-10-CM

## 2018-11-28 PROCEDURE — 99204 OFFICE O/P NEW MOD 45 MIN: CPT | Mod: S$GLB,,, | Performed by: NEUROLOGICAL SURGERY

## 2018-11-28 PROCEDURE — 99213 OFFICE O/P EST LOW 20 MIN: CPT | Mod: PBBFAC | Performed by: NEUROLOGICAL SURGERY

## 2018-11-28 PROCEDURE — 99999 PR PBB SHADOW E&M-EST. PATIENT-LVL III: CPT | Mod: PBBFAC,,, | Performed by: NEUROLOGICAL SURGERY

## 2018-11-28 NOTE — LETTER
November 30, 2018      Jayda Harvey MD  [address]  [facility] [address]           [facility] - Neurosurgery 7th Fl  [address]  [facility] [address]  Phone: [phone]          Patient: Edgar Patton   MR Number: [id number]   YOB: 1975   Date of Visit: 11/28/2018       Dear Dr. Jayda Harvey:    Thank you for referring Edgar Patton to me for evaluation. Attached you will find relevant portions of my assessment and plan of care.    If you have questions, please do not hesitate to call me. I look forward to following Edgar Patton along with you.    Sincerely,    Andrea Jay MD    Enclosure  CC:  No Recipients    If you would like to receive this communication electronically, please contact [email or web address] or [phone] to request more information on [employer] Link access.    For providers and/or their staff who would like to refer a patient to [facility], please contact us through our one-stop-shop provider referral line, [facility], at [phone].    If you feel you have received this communication in error or would no longer like to receive these types of communications, please e-mail [email or web address]

## 2018-11-28 NOTE — PATIENT INSTRUCTIONS
I have personally reviewed the MRI of L-spine with the pt which shows the following: No stenosis or bulge at L5-S1. There is a small disc bulge associated with stenosis at L4-5. Posterior Ligamentum flavum hypertrophy at L3-4 associated with stenosis. Disc herniation at L1-2 with more Ligamentum flavum hypertrophy. At T11-12 there is a large calcified disc bulge, eccentric to the right, compressing the spinal cord at the cauda equina, confirmed with recent CT of L-spine .    I highly recommend surgical intervention; open and minimally invasive approaches will be considered. Will order an MRI of the thoracic spine WO Contrast to establish a definitive plan and pt will follow up once imaging is complete.

## 2018-11-28 NOTE — PROGRESS NOTES
Subjective:   I, Tayo Mora, attest that this documentation has been prepared under the direction and in the presence of Andrea Jay MD.     Patient ID: Edgar Patton is a 43 y.o. male     Chief Complaint: Lumbar Spine Pain (L-Spine)      HPI  Mr. Edgar Patton is a 43 y.o. year old gentleman who presents today for consultation. The pt states he was in his normal state of health until a few weeks ago when he developed numbness in his LLE which has also started to involve the RLE. He endorses associated weakness in his RLE and notes times where his wife has to aid him in lifting his leg. He was active and working prior to his has been unable to work about a month now. He denies any recent accidents or trauma that may have attributed to his symptoms. He also denies urinary urgency/frequency or incontinence. Pt is R hand dominant.       Review of Systems   Constitutional: Negative for activity change, appetite change, fatigue, fever and unexpected weight change.   HENT: Negative for facial swelling.    Eyes: Negative.    Respiratory: Negative.    Cardiovascular: Negative.    Gastrointestinal: Negative for diarrhea, nausea and vomiting.   Endocrine: Negative.    Genitourinary: Negative.  Negative for difficulty urinating, frequency and urgency.   Musculoskeletal: Positive for back pain. Negative for joint swelling, myalgias and neck pain.   Neurological: Positive for weakness (BLE). Negative for dizziness, numbness and headaches.   Psychiatric/Behavioral: Negative.       Past Medical History:   Diagnosis Date    CKD (chronic kidney disease), stage II     Diabetes mellitus, type 2     Erectile dysfunction     Glaucoma     Hypertension     Malignant hypertension 3/16/2016    Noncompliance        Objective:      Vitals:    11/28/18 0946   BP: (!) 202/111   Pulse: 72   Temp:       Physical Exam   Constitutional: He is oriented to person, place, and time. He appears well-nourished.   Right-handed male.  Large habitus.   HENT:   Head: Normocephalic and atraumatic.   Neck: Neck supple.   Neurological: He is alert and oriented to person, place, and time. No cranial nerve deficit. He displays a negative Romberg sign. GCS eye subscore is 4. GCS verbal subscore is 5. GCS motor subscore is 6.   Reflex Scores:       Patellar reflexes are 2+ on the right side and 3+ on the left side.       Achilles reflexes are 2+ on the right side and 2+ on the left side.  Decreased sensation to pinprick in thigh worse on the left. Strength in BUE intact. 4/5 strength in the RLE with seated hip flexion and leg extension; 5/5 in the LLE.           IMAGING:  MRI Lumbar spine (11/27/2018): T2 weight images demonstrate the following:  No stenosis or bulge at L5-S1. There is a small disc bulge associated with stenosis at L4-5. Posterior Ligamentum flavum hypertrophy at L3-4 associated with stenosis. Disc herniation at L1-2 with more Ligamentum flavum hypertrophy. At T11-12 there is a large calcified disc bulge, eccentric to the right, compressing the spinal cord at the cauda equina, confirmed with recent CT of L-spine from 11/06/2018.      I have personally reviewed the images with the pt.      I, Dr. Andrea Jay, personally performed the services described in this documentation. All medical record entries made by the scribe, Tayo Mora, were at my direction and in my presence.  I have reviewed the chart and agree that the record reflects my personal performance and is accurate and complete. Andrea Jay MD. 11/28/2018    Assessment:      1. Herniated thoracic cord disc.  2. Thoracic spine compression.  3. Thoracic spondylosis.      Plan:   I have personally reviewed the MRI of L-spine with the pt which shows the following: No stenosis or bulge at L5-S1. There is a small disc bulge associated with stenosis at L4-5. Posterior Ligamentum flavum hypertrophy at L3-4 associated with stenosis. Disc herniation at L1-2 with more Ligamentum flavum  hypertrophy. At T11-12 there is a large calcified disc bulge, eccentric to the right, compressing the spinal cord at the cauda equina, confirmed with recent CT of L-spine .    I highly recommend surgical intervention; open and minimally invasive approaches will be considered. Will order an MRI of the thoracic spine WO Contrast to establish a definitive plan and pt will follow up once imaging is complete.

## 2018-11-28 NOTE — TELEPHONE ENCOUNTER
----- Message from Jayda Harvey MD sent at 11/28/2018  1:26 PM CST -----  The L spine ordered by me has been completed and the MRI T spine has been ordered by NSGY, so please follow up with the ordering physician for clarifications.   ----- Message -----  From: Yanci Rizvi MA  Sent: 11/28/2018   1:22 PM  To: Jayda Harvey MD        ----- Message -----  From: Ronan Syed  Sent: 11/28/2018  12:57 PM  To: Candice Montelongo Staff, Pierre AMEZCUA Staff    Needs Advice    Reason for call: Pt is asking MRI T SPINE order be faxed to [facility] at . Pt said he's scheduled for the MRI tomorrow. Pt is also calling to confirm if he needs to complete MRI L SPINE put in by Dr. Harvey.        Communication Preference: [phone]    Additional Information:

## 2018-11-28 NOTE — TELEPHONE ENCOUNTER
Called pt back. Explained that the number his sister gave us was an invalid number and that we would refax to the new number. Pt v/u.    ----- Message from Ronan Syed sent at 11/28/2018 12:57 PM CST -----  Needs Advice    Reason for call: Pt is asking MRI T SPINE order be faxed to [facility] at . Pt said he's scheduled for the MRI tomorrow. Pt is also calling to confirm if he needs to complete MRI L SPINE put in by Dr. Harvey.        Communication Preference: [phone]    Additional Information:

## 2018-11-28 NOTE — TELEPHONE ENCOUNTER
----- Message from Rell Tyler sent at 11/28/2018  2:37 PM CST -----  Contact: Mignon ( spouse ) @ [phone]  Caller calling to get the order for the MRI faxed to:  [facility]  @ [phone]

## 2018-11-28 NOTE — TELEPHONE ENCOUNTER
Appt made for pt to see Dr. Aponte for a consult tomorrow 11/29/18 at 3PM at [facility] neurosurgery clinic per request by Dr. Jay. Called pt, no answer, LVM with appt details.

## 2018-11-29 ENCOUNTER — OFFICE VISIT (OUTPATIENT)
Dept: NEUROSURGERY | Facility: CLINIC | Age: 43
End: 2018-11-29
Payer: COMMERCIAL

## 2018-11-29 ENCOUNTER — TELEPHONE (OUTPATIENT)
Dept: NEUROSURGERY | Facility: CLINIC | Age: 43
End: 2018-11-29

## 2018-11-29 VITALS — HEART RATE: 70 BPM | SYSTOLIC BLOOD PRESSURE: 216 MMHG | DIASTOLIC BLOOD PRESSURE: 124 MMHG

## 2018-11-29 DIAGNOSIS — M47.14 THORACIC MYELOPATHY: Primary | ICD-10-CM

## 2018-11-29 DIAGNOSIS — G95.9 CERVICAL MYELOPATHY: ICD-10-CM

## 2018-11-29 PROCEDURE — 99999 PR PBB SHADOW E&M-EST. PATIENT-LVL III: CPT | Mod: PBBFAC,,, | Performed by: NEUROLOGICAL SURGERY

## 2018-11-29 PROCEDURE — 99215 OFFICE O/P EST HI 40 MIN: CPT | Mod: S$GLB,,, | Performed by: NEUROLOGICAL SURGERY

## 2018-11-29 NOTE — PROGRESS NOTES
Dear Dr. Jay,     Thank you for referring this patient to my clinic. The full details of my evaluation will also be forthcoming to you by letter.    CHIEF COMPLAINT:  Consult for calcified thoracic disc    I, Curry Herrera, attest that this documentation has been prepared under the direction and in the presence of Oni Apnote MD.    HPI:  Edgar Patton is a 43 y.o.  male with history of stage 2 CKD, diabetes type 2, and malignant hypertension, on aspirin, who is referred to me by Dr. Jay for evaluation of a calcified disc. Pt reports numbness beginning in he LLE and weakness in his RLE approximately a month ago. He now notes intermittent numbness in the BLE, worse when walking. He experiences numbness in his groin as well but denies genital numbness. Pt's RLE occasionally gives out on him. Pt walks normally when he is not experiencing numbness and notices limping when experiencing the numbness. Pt denies any arm symptoms, neck pain, or low back pain. He also denies any b/b dysfunction. He states that his symptoms are progressing. Pt denies any headaches or visual changes.     Review of patient's allergies indicates:  No Known Allergies    Past Medical History:   Diagnosis Date    CKD (chronic kidney disease), stage II     Diabetes mellitus, type 2     Erectile dysfunction     Glaucoma     Hypertension     Malignant hypertension 3/16/2016    Noncompliance      No past surgical history on file.  Family History   Problem Relation Age of Onset    Hypertension Mother     Diabetes Mother     Polycystic kidney disease Mother     Arthritis Mother     Hypertension Father     Diabetes Father     Polycystic kidney disease Sister     Glaucoma Brother     Amblyopia Neg Hx     Blindness Neg Hx     Cataracts Neg Hx     Macular degeneration Neg Hx     Retinal detachment Neg Hx     Strabismus Neg Hx      Social History     Tobacco Use    Smoking status: Never Smoker    Smokeless tobacco: Never Used    Substance Use Topics    Alcohol use: No     Alcohol/week: 0.0 oz    Drug use: Not on file        Review of Systems   Constitutional: Negative.    HENT: Negative.    Eyes: Negative.    Respiratory: Negative.    Cardiovascular: Negative.    Gastrointestinal: Negative.    Endocrine: Negative.    Genitourinary: Negative.    Musculoskeletal: Negative for back pain, gait problem and neck pain.   Skin: Negative.    Allergic/Immunologic: Negative.    Neurological: Positive for numbness (BLE). Negative for weakness, light-headedness and headaches.   Hematological: Negative.    Psychiatric/Behavioral: Negative.        OBJECTIVE:   Vital Signs:  Pulse: 71 (11/29/18 1524)  BP: (!) 216/116 (11/29/18 1524)    Physical Exam:    Vital signs: All nursing notes and vital signs reviewed -- afebrile, vital signs stable.  Constitutional: Patient sitting comfortably in chair. Appears well developed and well nourished.  Skin: Exposed areas are intact without abnormal markings, rashes or other lesions.  HEENT: Normocephalic. Normal conjunctivae.  Cardiovascular: Normal rate and regular rhythm.  Respiratory: Chest wall rises and falls symmetrically, without signs of respiratory distress.  Abdomen: Soft and non-tender.  Extremities: Warm and without edema. Calves supple, non-tender.  Psych/Behavior: Normal affect.    Neurological:    Mental status: Alert and oriented. Conversational and appropriate.       Cranial Nerves: Grossly intact.     Motor:    Upper:  Deltoids Triceps Biceps WE WF     R 5/5 5/5 5/5 5/5 5/5 5/5    L 5/5 5/5 5/5 5/5 5/5 5/5      Lower:  HF KE KF DF PF EHL    R 4-/5 4+/5 5/5 5/5 5/5 5/5    L 4/5 5/5 5/5 5/5 5/5 5/5     Sensory: Intact sensation to light touch in all extremities. Romberg positive.    Reflexes:          DTR: 2+ patellar, bilaterally.     Gallardo's: Negative.     Babinski's: Equivocal.     Clonus: Negative.    Cerebellar: Finger-to-nose and rapid alternating movements normal. Mild ataxic  gait.    Spine:    Posture: Head well aligned over pelvis in front and side views.  No focal or global spinal deformity visible on inspection. Shoulders and hips even. No obvious leg length discrepancy. No scapula winging.    Bending: Full ROM with forward, back and lateral bending. No rib prominence with forward bend.    Cervical:      ROM: Full with flexion, extension, lateral rotation and ear-to-shoulder bend.      Midline TTP: Negative.     Spurling's test: Negative.     Lhermitte's: Negative.    Thoracic:     Midline TTP: Negative    Lumbar:     Midline TTP: Negative     Straight Leg Test: Negative     Crossed Straight Leg Test: Negative     Sciatic notch tenderness: Negative.    Other:     SI joint TTP: Negative.     Greater trochanter TTP: Negative.     Tenderness with external/internal hip rotation: Negative.    Diagnostic Results:  All imaging was independently reviewed by me.    MRI T-spine, dated 11/29/2018:  1. Severe stenosis at T8/9, T9/10, T10/11, and T11/12    MRI L-spine, dated 11/14/2018:  1. Congenitally narrow spinal canal  2. Multilevel stenosis worst at T12/L1   3. Giant calcified disc and OPLL   4. Greater than 80 % obliteration of the canal    CT L-spine, dated 11/6/2018:  1. Central calcifiied disc herniation mildly eccentric to the right  2. Severe circumferential stenosis at L1/2 from OPLL and ossified ligament flavum  3. Severe central stenosis from T11 to L3 from OPLL    ASSESSMENT/PLAN:     Edgar Patton has OPLL and ossified ligamentum flavum causing severe spinal cord and nerve root compression at multiple levels from the mid thoracic to the mid lumbar spine. His worst level is T12-L1 where he has a giant calcified central disc taking up more than 80% of the spinal canal. He had been asymptomatic up until 1 month ago, but now has episodes of BLE numbness when walking with concomitant gait instability and leg weakness. He believes these problems are also getting worse from week to  week. It is clear that he will need a long segment surgical decompression of the spinal cord and instrumented fusion from the thoracic to lumbar spine, and I would characterize the recommendation as urgent but not emergent. I will need a CT of the C and T spine, an MRI C spine, and a scoliosis xray prior to surgical recommendations. I will see him as an outpatient as soon as these studies are done.    Most urgently, he has presented today with malignant hypertension with SBP >200 but without symptoms. I have directed him to the ED for medical evaluation and management. I expect that he will likely be admitted to medicine service, and there we would follow him and expedite his spinal workup.    The patient understands and agrees with the plan of care. All questions were answered.     1. MRI C-spine  2. CT C-spine  3. CT T-spine  4. RTC pending #1-3  5. Visit ED today for high blood pressure      I, Dr. Oni Aponte personally performed the services described in this documentation. All medical record entries made by the scribe, Curry Herrera, were at my direction and in my presence.  I have reviewed the chart and agree that the record reflects my personal performance and is accurate and complete.      Oni Aponte M.D.  Department of Neurosurgery  [facility]      .

## 2018-11-29 NOTE — TELEPHONE ENCOUNTER
Saw pt in clinic for a surgical consult.  Advised pt to go to the ED regarding his elevated BP.  Took pt's BP twice to see if it would go down within a 20 minute period, BP actually increased.  Pt has agreed to go to the ED and is heading there now (11.29.2018, 1650).  Pt will ultimately need surgery so we have requested from the ED that the pt have his scans completed while he is admitted.  V/U.

## 2018-11-30 ENCOUNTER — TELEPHONE (OUTPATIENT)
Dept: NEUROSURGERY | Facility: CLINIC | Age: 43
End: 2018-11-30

## 2018-11-30 NOTE — TELEPHONE ENCOUNTER
"Sw pt to FU regarding pt's elevated BP yesterday at clinic visit. Pt states that in spite of our insistence, he did not go to the ED after his appt. Pt states his BP was high because of the anxiety of getting news about surgery, and that when he got home and relaxed his BP went down to "around 160 over 90).     I explained to the pt that even if his BP went down, the fact that it gets up as high as yesterday means that adjustments likely needs to be made to his current medication regimen so that better BP control is achieved. Explained risks of having such high BP, and advised pt that lack of BP control could delay surgery. Pt stated he spoke with his PCP and is going to see him about his BP next week.     Scoliosis x-ray, CT C & T spine scheduled to be done in [place] per pt's request. MRI C spine is scheduled for 12/10 at [facility] to be done with sedation due to pt's insistence that he will not be able to tolerate a closed MRI. Appt dates, times, and locations given to pt, pt v/u. Appt reminders in the mail.  "

## 2018-12-03 ENCOUNTER — TELEPHONE (OUTPATIENT)
Dept: NEUROSURGERY | Facility: CLINIC | Age: 43
End: 2018-12-03

## 2018-12-03 ENCOUNTER — OFFICE VISIT (OUTPATIENT)
Dept: INTERNAL MEDICINE | Facility: CLINIC | Age: 43
End: 2018-12-03
Payer: COMMERCIAL

## 2018-12-03 ENCOUNTER — TELEPHONE (OUTPATIENT)
Dept: OPHTHALMOLOGY | Facility: CLINIC | Age: 43
End: 2018-12-03

## 2018-12-03 DIAGNOSIS — E11.21 DIABETIC NEPHROPATHY ASSOCIATED WITH TYPE 2 DIABETES MELLITUS: Chronic | ICD-10-CM

## 2018-12-03 DIAGNOSIS — M48.061 SPINAL STENOSIS OF LUMBAR REGION, UNSPECIFIED WHETHER NEUROGENIC CLAUDICATION PRESENT: ICD-10-CM

## 2018-12-03 DIAGNOSIS — Q61.2 POLYCYSTIC KIDNEY DISEASE, AUTOSOMAL DOMINANT: Chronic | ICD-10-CM

## 2018-12-03 DIAGNOSIS — I10 UNCONTROLLED HYPERTENSION: Primary | ICD-10-CM

## 2018-12-03 DIAGNOSIS — E11.9 TYPE 2 DIABETES MELLITUS WITHOUT COMPLICATION, WITHOUT LONG-TERM CURRENT USE OF INSULIN: ICD-10-CM

## 2018-12-03 DIAGNOSIS — B96.89 ACUTE BACTERIAL SINUSITIS: ICD-10-CM

## 2018-12-03 DIAGNOSIS — N18.2 CKD (CHRONIC KIDNEY DISEASE), STAGE II: Chronic | ICD-10-CM

## 2018-12-03 DIAGNOSIS — R79.89 LOW TESTOSTERONE: ICD-10-CM

## 2018-12-03 DIAGNOSIS — N18.2 HYPERTENSIVE KIDNEY DISEASE WITH STAGE 2 CHRONIC KIDNEY DISEASE: Chronic | ICD-10-CM

## 2018-12-03 DIAGNOSIS — I12.9 HYPERTENSIVE KIDNEY DISEASE WITH STAGE 2 CHRONIC KIDNEY DISEASE: Chronic | ICD-10-CM

## 2018-12-03 DIAGNOSIS — I10 ESSENTIAL HYPERTENSION: ICD-10-CM

## 2018-12-03 DIAGNOSIS — E66.01 MORBID OBESITY: Chronic | ICD-10-CM

## 2018-12-03 DIAGNOSIS — J01.90 ACUTE BACTERIAL SINUSITIS: ICD-10-CM

## 2018-12-03 DIAGNOSIS — Z91.199 NONCOMPLIANCE: Chronic | ICD-10-CM

## 2018-12-03 DIAGNOSIS — I51.89 DIASTOLIC DYSFUNCTION WITHOUT HEART FAILURE: Chronic | ICD-10-CM

## 2018-12-03 DIAGNOSIS — R29.898 WEAKNESS OF RIGHT LOWER EXTREMITY: ICD-10-CM

## 2018-12-03 DIAGNOSIS — N52.9 ERECTILE DYSFUNCTION, UNSPECIFIED ERECTILE DYSFUNCTION TYPE: Chronic | ICD-10-CM

## 2018-12-03 DIAGNOSIS — M47.14 THORACIC MYELOPATHY: Primary | ICD-10-CM

## 2018-12-03 DIAGNOSIS — R20.0 LOWER EXTREMITY NUMBNESS: ICD-10-CM

## 2018-12-03 PROCEDURE — 99214 OFFICE O/P EST MOD 30 MIN: CPT | Mod: PBBFAC,PO | Performed by: FAMILY MEDICINE

## 2018-12-03 PROCEDURE — 99215 OFFICE O/P EST HI 40 MIN: CPT | Mod: S$GLB,,, | Performed by: FAMILY MEDICINE

## 2018-12-03 PROCEDURE — 99999 PR PBB SHADOW E&M-EST. PATIENT-LVL IV: CPT | Mod: PBBFAC,,, | Performed by: FAMILY MEDICINE

## 2018-12-03 RX ORDER — ERGOCALCIFEROL 1.25 MG/1
50000 CAPSULE ORAL
Qty: 8 CAPSULE | Refills: 0 | Status: SHIPPED | OUTPATIENT
Start: 2018-12-03

## 2018-12-03 RX ORDER — POTASSIUM CHLORIDE 750 MG/1
10 TABLET, EXTENDED RELEASE ORAL 2 TIMES DAILY
Qty: 30 TABLET | Refills: 1 | Status: SHIPPED | OUTPATIENT
Start: 2018-12-03 | End: 2019-02-05 | Stop reason: CLARIF

## 2018-12-03 RX ORDER — HYDROCODONE BITARTRATE AND ACETAMINOPHEN 7.5; 325 MG/1; MG/1
1 TABLET ORAL EVERY 6 HOURS PRN
Qty: 20 TABLET | Refills: 0 | Status: SHIPPED | OUTPATIENT
Start: 2018-12-03 | End: 2018-12-20 | Stop reason: SDUPTHER

## 2018-12-03 RX ORDER — AMOXICILLIN AND CLAVULANATE POTASSIUM 875; 125 MG/1; MG/1
1 TABLET, FILM COATED ORAL EVERY 12 HOURS
Qty: 20 TABLET | Refills: 0 | Status: SHIPPED | OUTPATIENT
Start: 2018-12-03 | End: 2018-12-20

## 2018-12-03 RX ORDER — ROSUVASTATIN CALCIUM 10 MG/1
10 TABLET, COATED ORAL DAILY
Qty: 90 TABLET | Refills: 1 | Status: SHIPPED | OUTPATIENT
Start: 2018-12-03 | End: 2019-12-03

## 2018-12-03 RX ORDER — LISINOPRIL 40 MG/1
TABLET ORAL
Qty: 180 TABLET | Refills: 1 | Status: SHIPPED | OUTPATIENT
Start: 2018-12-03 | End: 2018-12-20

## 2018-12-03 RX ORDER — CARVEDILOL 25 MG/1
TABLET ORAL
Qty: 270 TABLET | Refills: 1 | Status: SHIPPED | OUTPATIENT
Start: 2018-12-03 | End: 2022-12-12 | Stop reason: SDUPTHER

## 2018-12-03 RX ORDER — FUROSEMIDE 40 MG/1
40 TABLET ORAL DAILY
Qty: 30 TABLET | Refills: 1 | Status: SHIPPED | OUTPATIENT
Start: 2018-12-03 | End: 2019-01-10

## 2018-12-03 NOTE — TELEPHONE ENCOUNTER
Sw pt regarding adding MRI thoracic and Lumbar to scheduled MRI cervical. Since the pt is receiving sedation for the MRI cervical and the MRI T&L were poor quality due to being performed in open MRI, decided to add these scans to scheduled MRI cervical. Orders in place, MRI  added additional scans to appt 12/10/18. Called pt to relay this information to him. Also advised him to stay NPO 4 hours prior to scheduled scan and to arrange for a ride after imaging. Pt v/u.

## 2018-12-04 ENCOUNTER — TELEPHONE (OUTPATIENT)
Dept: SPINE | Facility: CLINIC | Age: 43
End: 2018-12-04

## 2018-12-04 ENCOUNTER — TELEPHONE (OUTPATIENT)
Dept: INTERNAL MEDICINE | Facility: CLINIC | Age: 43
End: 2018-12-04

## 2018-12-04 NOTE — TELEPHONE ENCOUNTER
Sister called- she said pt doesn't want to take pain med now,he wants an order for nerve pain- like gabapentin.    He wants an order called in now.      They would like it called into the [facility] in [place]

## 2018-12-04 NOTE — TELEPHONE ENCOUNTER
Spoke w/pt and will call tomorrow to confirm he can  his outside MRI disc, want to verify it has not been sent to MR and save him a trip.  Reconfirmed his upcoming MRIs scheduled for 12.10 and discussed sedation.  Also discussed that I spoke with the financial dept and he ha been given clearance to have these done.  V/U.  ----- Message from Savanna Mann sent at 12/4/2018 11:03 AM CST -----  Contact: Pt. [phone]  Needs Advice    Reason for call: The patient would like to speak to someone regarding his MRI disc. He's trying to have his brother stop by to pick them up. Please contact the patient to discuss further.          Communication Preference:PHONE     Additional Information:

## 2018-12-04 NOTE — TELEPHONE ENCOUNTER
Spoke Ariane and verified that the MRIs ordered are of medical necessity because the previous images of the [facility] were not clear and based on Dr. Aponte's assessment, his ability to accurately assess the pt requires better quality imaging.  We will need MRIs of the C, L, T and sedation will be provided for the pt.  JET See sent to SALTY Bunn Staff             Financial Call Center has not been able to contact patient.   Pt has a liability and/or residual balance due.              Is this procedure medically urgent or non-medically   Please respond via In-basket or contact [facility] @ [phone]                 Medical Urgency Definition     If you can Answer yes to one of the following questions, the   Case will be considered Medically Urgent and will be done as   Scheduled irrespective of whether [facility] will receive payment.     *If this particular case is not done as scheduled, would the patient   Experience loss of life?     *Loss of Limb?     *Irreversible loss of function?     *Would their condition significantly worsen?     If none of these questions have a yes answer, the case   Should be postponed until such a time as the finances   can be sorted out.

## 2018-12-04 NOTE — TELEPHONE ENCOUNTER
----- Message from Kalyani Mark sent at 12/4/2018  1:59 PM CST -----  Contact: Ary/Sister/[phone]  Neuro surgeon moved the pt's pharmacy but the pt;s pain is getting worse. They told him that he needs to reach out to his PCP if he's having pain. Please advise.        Thanks

## 2018-12-04 NOTE — TELEPHONE ENCOUNTER
----- Message from Kayla Aguilar sent at 12/4/2018  2:49 PM CST -----  Contact: Ary (Sister) [phone]  Ary states that Edgar is requesting a medication for the nerve pain maybe Gabapentin. Please call to advise.

## 2018-12-04 NOTE — TELEPHONE ENCOUNTER
Spoke with pt's sister,she said that pt's procedure was pushed back by neurosurgery,and his pain is much worse,and would like something for pain called in,because that's what Neurosurgery said.      Explain that he just received pain meds yesterday.She asked if something else can be called in stronger because he is in severe pain. Asked sister that if he is in that much pain,he need to be seen in the ER to be evaluated.

## 2018-12-05 ENCOUNTER — TELEPHONE (OUTPATIENT)
Dept: NEPHROLOGY | Facility: CLINIC | Age: 43
End: 2018-12-05

## 2018-12-05 VITALS
DIASTOLIC BLOOD PRESSURE: 98 MMHG | TEMPERATURE: 99 F | WEIGHT: 296.31 LBS | HEART RATE: 66 BPM | BODY MASS INDEX: 43.89 KG/M2 | SYSTOLIC BLOOD PRESSURE: 174 MMHG | HEIGHT: 69 IN

## 2018-12-05 NOTE — TELEPHONE ENCOUNTER
----- Message from Tawanda Lee MD sent at 12/5/2018  3:56 PM CST -----  Contact: Kala  I called the number but there was no answer. I reviewed his chart. Looks like he has been having severe back pain and he is being followed by neurosurgery and PCP. Also noted, he was recently prescribed Vicodin for pain by his PCP.    If he is having worsening of pain despite this which can also affect his BP control, it would be best managed in the ER.    Hope that helps. I defer antihypertensive management to PCP and I had communication with PCP about that.    Thanks.   ----- Message -----  From: Mohsen Gutiérrez MA  Sent: 12/5/2018   3:24 PM  To: Tawanda Lee MD        ----- Message -----  From: Alla Albarran  Sent: 12/5/2018  11:59 AM  To: [facility] Staff    .Needs Advice    Reason for call: Pt is elvated his BP Systole 150 + Diastole- . Needs to discuss med for his back pain        Communication Preference: [phone]    Additional Information:

## 2018-12-05 NOTE — TELEPHONE ENCOUNTER
I called the number but there was no answer. I reviewed his chart. Looks like he has been having severe back pain and he is being followed by neurosurgery and PCP. Also noted, he was recently prescribed Vicodin for pain by his PCP.     If he is having worsening of pain despite this which can also affect his BP control, it would be best managed in the ER.     Hope that helps. I defer antihypertensive management to PCP and I had communication with PCP about that.          Call the patient there was no answer I left a voice mail and a call back number  NW

## 2018-12-05 NOTE — PROGRESS NOTES
Subjective:   Patient ID: Edgar Patton is a 43 y.o. male.    Chief Complaint: Follow-up (HTN) and Numbness (left leg)      HPI  42 yo male here with wife for uncontrolled essential hypertension. He has type 2 diabetes and is morbidly obese and has a ho noncompliance with medical treatments inc f/u. (18/56 appts with [facility] have been no-shows.) He has surgery tentatively planned for severe spinal cord and nerve root compression at multiple levels from the mid thoracic to the mid lumbar spine. He is having LE pain and weakness.     His sis has a brother who is card in [facility] and requests I speak to him by phone in clinic re bp control and I agree.    Patient queried and denies any further complaints.      ALLERGIES AND MEDICATIONS: updated and reviewed.  Review of patient's allergies indicates:   Allergen Reactions    Amlodipine      Leg swelling       Current Outpatient Medications:     aspirin (ECOTRIN) 81 MG EC tablet, Take 81 mg by mouth once daily., Disp: , Rfl:     carvedilol (COREG) 25 MG tablet, 1.5 tabs po bid, Disp: 270 tablet, Rfl: 1    cyclobenzaprine (FLEXERIL) 10 MG tablet, 1/2 to 1 po qhs prn severe muscle spasms, Disp: 30 tablet, Rfl: 0    lisinopril (PRINIVIL,ZESTRIL) 40 MG tablet, One po bid for hypertension, Disp: 180 tablet, Rfl: 1    metFORMIN (GLUCOPHAGE) 1000 MG tablet, Take 1 tablet (1,000 mg total) by mouth 2 (two) times daily with meals., Disp: 180 tablet, Rfl: 1    methocarbamol (ROBAXIN) 500 MG Tab, TAKE 1 TABLET BY MOUTH 4 TIMES DAILY FOR 10 DAYS FOR MUSCLE SPASM, Disp: 40 tablet, Rfl: 0    rosuvastatin (CRESTOR) 10 MG tablet, Take 1 tablet (10 mg total) by mouth once daily., Disp: 90 tablet, Rfl: 1    sildenafil (VIAGRA) 100 MG tablet, Take 1 tablet (100 mg total) by mouth as needed for Erectile Dysfunction., Disp: 30 tablet, Rfl: 11    amoxicillin-clavulanate 875-125mg (AUGMENTIN) 875-125 mg per tablet, Take 1 tablet by mouth every 12 (twelve) hours., Disp: 20 tablet, Rfl: 0     ergocalciferol (ERGOCALCIFEROL) 50,000 unit Cap, Take 1 capsule (50,000 Units total) by mouth every 7 days., Disp: 8 capsule, Rfl: 0    furosemide (LASIX) 40 MG tablet, Take 1 tablet (40 mg total) by mouth once daily., Disp: 30 tablet, Rfl: 1    HYDROcodone-acetaminophen (NORCO) 7.5-325 mg per tablet, Take 1 tablet by mouth every 6 (six) hours as needed (severe pain). Dx ICD10 M54.5 Low back pain, Disp: 20 tablet, Rfl: 0    latanoprost 0.005 % ophthalmic solution, Place 1 drop into both eyes nightly., Disp: 2.5 mL, Rfl: 6    potassium chloride SA (K-DUR,KLOR-CON) 10 MEQ tablet, Take 1 tablet (10 mEq total) by mouth 2 (two) times daily., Disp: 30 tablet, Rfl: 1    valACYclovir (VALTREX) 1000 MG tablet, Take 1 tablet (1,000 mg total) by mouth 3 (three) times daily. for 7 days, Disp: 21 tablet, Rfl: 0    Review of Systems   Constitutional: Positive for activity change and fatigue. Negative for appetite change, chills, diaphoresis, fever and unexpected weight change.   HENT: Negative for congestion, ear discharge, ear pain, facial swelling, hearing loss, nosebleeds, postnasal drip, rhinorrhea, sinus pressure, sneezing, sore throat, tinnitus, trouble swallowing and voice change.    Eyes: Negative for photophobia, pain, discharge, redness, itching and visual disturbance.   Respiratory: Negative for cough, chest tightness, shortness of breath and wheezing.    Cardiovascular: Negative for chest pain, palpitations and leg swelling.   Gastrointestinal: Negative for abdominal distention, abdominal pain, anal bleeding, blood in stool, constipation, diarrhea, nausea, rectal pain and vomiting.   Endocrine: Negative for cold intolerance, heat intolerance, polydipsia, polyphagia and polyuria.   Genitourinary: Negative for decreased urine volume, difficulty urinating, discharge, dysuria, enuresis, flank pain, frequency, genital sores, hematuria, penile pain, penile swelling, scrotal swelling, testicular pain and urgency.  "  Musculoskeletal: Positive for arthralgias, back pain and gait problem. Negative for joint swelling, neck pain and neck stiffness.   Skin: Negative for rash.   Allergic/Immunologic: Negative for environmental allergies, food allergies and immunocompromised state.   Neurological: Positive for weakness and numbness. Negative for dizziness, tremors, seizures, syncope, speech difficulty, light-headedness and headaches.   Hematological: Negative for adenopathy. Does not bruise/bleed easily.   Psychiatric/Behavioral: Positive for dysphoric mood. Negative for behavioral problems, confusion, decreased concentration, sleep disturbance and suicidal ideas. The patient is not nervous/anxious and is not hyperactive.        Objective:     Vitals:    12/03/18 0942   BP: (!) 174/98   Pulse: 66   Temp: 98.5 °F (36.9 °C)   TempSrc: Oral   Weight: 134.4 kg (296 lb 4.8 oz)   Height: 5' 9" (1.753 m)   PainSc: 0-No pain     Body mass index is 43.76 kg/m².    Physical Exam   Constitutional: He is oriented to person, place, and time. He appears well-developed and well-nourished. He is cooperative. He does not have a sickly appearance. No distress.   HENT:   Head: Normocephalic and atraumatic.   Right Ear: Hearing, tympanic membrane and ear canal normal. No tenderness.   Left Ear: Hearing, tympanic membrane and ear canal normal. No tenderness.   Eyes: Conjunctivae and lids are normal. Right eye exhibits no discharge. Left eye exhibits no discharge. Right conjunctiva is not injected. Left conjunctiva is not injected. No scleral icterus. Right eye exhibits normal extraocular motion. Left eye exhibits normal extraocular motion.   Neck: Carotid bruit is not present. No edema present.   Cardiovascular: Normal rate, regular rhythm, normal heart sounds and normal pulses. Exam reveals no friction rub.   No murmur heard.  Pulmonary/Chest: Effort normal and breath sounds normal. No accessory muscle usage. No respiratory distress. He has no wheezes. " He has no rhonchi. He has no rales.   Abdominal: He exhibits no abdominal bruit and no pulsatile midline mass. There is no hepatosplenomegaly. There is no CVA tenderness, no tenderness at McBurney's point and negative Ren's sign.   Lymphadenopathy:        Head (right side): No submandibular, no preauricular and no posterior auricular adenopathy present.        Head (left side): No submandibular, no preauricular and no posterior auricular adenopathy present.   Neurological: He is alert and oriented to person, place, and time. GCS eye subscore is 4. GCS verbal subscore is 5. GCS motor subscore is 6.   Skin: Skin is warm and dry. No ecchymosis and no rash noted. Rash is not maculopapular and not urticarial. He is not diaphoretic. No cyanosis or erythema. Nails show no clubbing.   Psychiatric: His speech is normal. His mood appears not anxious. His affect is not angry and not inappropriate. He does not exhibit a depressed mood.   Nursing note and vitals reviewed.      Assessment and Plan:   Edgar was seen today for follow-up and numbness.    Diagnoses and all orders for this visit:    Uncontrolled hypertension    Essential hypertension  -     lisinopril (PRINIVIL,ZESTRIL) 40 MG tablet; One po bid for hypertension  -     carvedilol (COREG) 25 MG tablet; 1.5 tabs po bid    Type 2 diabetes mellitus without complication, without long-term current use of insulin  -     lisinopril (PRINIVIL,ZESTRIL) 40 MG tablet; One po bid for hypertension    Noncompliance    Low testosterone  -     Ambulatory consult to Urology    Hypertensive kidney disease with stage 2 chronic kidney disease    Polycystic kidney disease, autosomal dominant    CKD (chronic kidney disease), stage II    Diabetic nephropathy associated with type 2 diabetes mellitus    Diastolic dysfunction without heart failure    Erectile dysfunction, unspecified erectile dysfunction type    Morbid obesity    Weakness of right lower extremity    Lower extremity  numbness    Acute bacterial sinusitis    Other orders  -     HYDROcodone-acetaminophen (NORCO) 7.5-325 mg per tablet; Take 1 tablet by mouth every 6 (six) hours as needed (severe pain). Dx ICD10 M54.5 Low back pain  -     furosemide (LASIX) 40 MG tablet; Take 1 tablet (40 mg total) by mouth once daily.  -     potassium chloride SA (K-DUR,KLOR-CON) 10 MEQ tablet; Take 1 tablet (10 mEq total) by mouth 2 (two) times daily.  -     amoxicillin-clavulanate 875-125mg (AUGMENTIN) 875-125 mg per tablet; Take 1 tablet by mouth every 12 (twelve) hours.  -     ergocalciferol (ERGOCALCIFEROL) 50,000 unit Cap; Take 1 capsule (50,000 Units total) by mouth every 7 days.  -     rosuvastatin (CRESTOR) 10 MG tablet; Take 1 tablet (10 mg total) by mouth once daily.    Hydrate, rest, OTC Mucinex Expectorant as directed, Nasal saline as needed.  OTC Zyrtec as directed.    Declines hydralazine. Declines calcium channel blocker bc of past problems with le edema with amlodipine despite educ.    Spoke with his brother-in-law per pt request about tx plan    Time spent in the evaluation and management of this patient exceeded 60min and greater than 50% of this time was in face-to-face education regarding diagnoses, medications, plan, and follow-up.    Follow-up in about 2 weeks (around 12/17/2018), or if symptoms worsen or fail to improve.    THIS NOTE WILL BE SHARED WITH THE PATIENT.

## 2018-12-06 ENCOUNTER — HOSPITAL ENCOUNTER (OUTPATIENT)
Dept: RADIOLOGY | Facility: HOSPITAL | Age: 43
Discharge: HOME | End: 2018-12-06
Attending: NEUROLOGICAL SURGERY
Payer: MEDICAID

## 2018-12-06 ENCOUNTER — TELEPHONE (OUTPATIENT)
Dept: NEPHROLOGY | Facility: CLINIC | Age: 43
End: 2018-12-06

## 2018-12-06 DIAGNOSIS — G95.9 CERVICAL MYELOPATHY: ICD-10-CM

## 2018-12-06 DIAGNOSIS — M47.14 THORACIC MYELOPATHY: ICD-10-CM

## 2018-12-06 PROCEDURE — 72082 X-RAY EXAM ENTIRE SPI 2/3 VW: CPT | Mod: TC,FY,PO

## 2018-12-06 PROCEDURE — 72128 CT CHEST SPINE W/O DYE: CPT | Mod: TC,PO

## 2018-12-06 PROCEDURE — 72125 CT NECK SPINE W/O DYE: CPT | Mod: TC,PO

## 2018-12-06 RX ORDER — NIFEDIPINE 30 MG/1
30 TABLET, EXTENDED RELEASE ORAL DAILY
Qty: 30 TABLET | Refills: 11 | Status: SHIPPED | OUTPATIENT
Start: 2018-12-06 | End: 2021-01-29

## 2018-12-06 NOTE — TELEPHONE ENCOUNTER
Today I was contacted by pt's cousin, Dr. Rollins, who is a practicing cardiologist in [place]. He requested we discuss his antihypertensive regimen.    I explained that during last visit I had noticed pt was occasionally missing antihypertensive medicines and also had gained several lbs of weight. He expressed understanding of this.    He informed me he has been trying to have patient take medicines on time and he confirmed lately he has been doing so but due to severe back pain he has been in lot of discomfort.    He informed me that despite several medicines he still has SBP in 170-180-190 range on most occasions, occasionally also going up to 200.    We discussed his antihypertensive regimen. He informed me currently pt is taking  - coreg 37.5 mg bid  - lasix 40 mg daily  - lisinopril 40 mg bid    He informed me that previously listed side effect to amlodipine was only in the form of leg swelling which is known to be due to peripheral vasodilatation.    Having said that, he confirmed pt has no symptoms to suggest CHF. He has heart rate in the range of 70's.    We discussed and agreed to add following agent as pt needs additional medicine.    - Procardia XL 30 mg daily    I informed him that I advise follow up on BP closely, also after dec 19th I will not be available for urgent decisions as I will be out of [place]. I did provide some suggestions as to as follows if he does not respond to above.    - to gradually increase the dose of procardia XL to 60- mg per day if no response to 30 mg dose per day  - additionally they can also consider changing from coreg to labetalol bid or tid  - also I suggested to consider hydralazine or clonidine orally as another option depending on BP response over the next 1-2 weeks  - I stressed he will need to see an MD for urgent situations like SBP > 200 and those cannot be handled from the office or over the telephone and also stressed to have follow up on BP with PCP in the  interim  - lisinopril 40 mg bid may not add additional benefit as max dose is 40 mg per day, so he can keep the dose for this at 40 mg per day  - ok for lasix from my standpoint if he has any edema peripherally but long term benefit of this as antihypertensive is ? Unless any signs or symptoms of fluid retention    Rx for procardia XL sent to his pharmacy. Dr. Rollins, pt's cousing agreed with this plan and he will be closely following his cousin's blood pressure and watch him for any new symptoms.     Copy note to   Nephrology office staff  PCP Dr. Cotton

## 2018-12-07 ENCOUNTER — TELEPHONE (OUTPATIENT)
Dept: NEUROSURGERY | Facility: CLINIC | Age: 43
End: 2018-12-07

## 2018-12-07 NOTE — TELEPHONE ENCOUNTER
Pt states he is not comfortable with the enclosed MRI because of previous childhood trauma. Asked that pt still go for his MRIs and attempt to have them completed.  Stated that the MRIs must be complete before a surgery can be completed.  V/U.  ----- Message from Savanna Mann sent at 12/7/2018  3:00 PM CST -----  Contact: Pt. [phone]  Patient Returning Call from [facility]    Who Left Message for Patient:MAy    Communication Preference:PHONE     Additional Information:

## 2018-12-10 ENCOUNTER — TELEPHONE (OUTPATIENT)
Dept: NEUROSURGERY | Facility: CLINIC | Age: 43
End: 2018-12-10

## 2018-12-10 NOTE — TELEPHONE ENCOUNTER
Pt called to reschedule his MRIs; rescheduled for 12.17.2018 w/sedation.  Explained to pt that the MRIs must be completed before his next visit w/Dr. Aponte. V/U.

## 2018-12-12 ENCOUNTER — TELEPHONE (OUTPATIENT)
Dept: NEUROSURGERY | Facility: CLINIC | Age: 43
End: 2018-12-12

## 2018-12-12 NOTE — TELEPHONE ENCOUNTER
Returned call, no answer, M informing the pt that his MRI disc is at the  of the 7th floor [facility] at [facility] and he can come pick it up from the  staff any week day between 8AM and 4PM.     ----- Message from Rell Tyler sent at 12/12/2018  3:28 PM CST -----  Contact: Lorie ( sister ) @ [phone]  Caller requesting a return call from Too regarding uploading the MRI for viewing by a family doctor / friend.

## 2018-12-17 ENCOUNTER — HOSPITAL ENCOUNTER (OUTPATIENT)
Dept: RADIOLOGY | Facility: HOSPITAL | Age: 43
Discharge: HOME | End: 2018-12-17
Attending: NEUROLOGICAL SURGERY
Payer: COMMERCIAL

## 2018-12-17 ENCOUNTER — HOSPITAL ENCOUNTER (OUTPATIENT)
Dept: RADIOLOGY | Facility: HOSPITAL | Age: 43
Discharge: HOME | End: 2018-12-17
Attending: NEUROLOGICAL SURGERY
Payer: MEDICAID

## 2018-12-17 VITALS
OXYGEN SATURATION: 98 % | SYSTOLIC BLOOD PRESSURE: 162 MMHG | SYSTOLIC BLOOD PRESSURE: 148 MMHG | DIASTOLIC BLOOD PRESSURE: 83 MMHG | HEART RATE: 80 BPM | RESPIRATION RATE: 18 BRPM | HEART RATE: 74 BPM | DIASTOLIC BLOOD PRESSURE: 94 MMHG | OXYGEN SATURATION: 94 %

## 2018-12-17 DIAGNOSIS — M47.14 THORACIC MYELOPATHY: ICD-10-CM

## 2018-12-17 DIAGNOSIS — M48.061 SPINAL STENOSIS OF LUMBAR REGION, UNSPECIFIED WHETHER NEUROGENIC CLAUDICATION PRESENT: ICD-10-CM

## 2018-12-17 DIAGNOSIS — G95.9 CERVICAL MYELOPATHY: ICD-10-CM

## 2018-12-17 PROCEDURE — 72141 MRI NECK SPINE W/O DYE: CPT | Mod: TC

## 2018-12-17 PROCEDURE — 72146 MRI CHEST SPINE W/O DYE: CPT | Mod: TC

## 2018-12-17 PROCEDURE — 72148 MRI LUMBAR SPINE W/O DYE: CPT | Mod: TC

## 2018-12-17 PROCEDURE — 72141 MRI NECK SPINE W/O DYE: CPT | Mod: 26,,, | Performed by: RADIOLOGY

## 2018-12-17 PROCEDURE — 72148 MRI LUMBAR SPINE W/O DYE: CPT | Mod: 26,,, | Performed by: RADIOLOGY

## 2018-12-17 PROCEDURE — 63600175 PHARM REV CODE 636 W HCPCS: Performed by: STUDENT IN AN ORGANIZED HEALTH CARE EDUCATION/TRAINING PROGRAM

## 2018-12-17 PROCEDURE — 72146 MRI CHEST SPINE W/O DYE: CPT | Mod: 26,,, | Performed by: RADIOLOGY

## 2018-12-17 RX ORDER — MIDAZOLAM HYDROCHLORIDE 1 MG/ML
2 INJECTION, SOLUTION INTRAMUSCULAR; INTRAVENOUS ONCE
Status: COMPLETED | OUTPATIENT
Start: 2018-12-17 | End: 2018-12-17

## 2018-12-17 RX ADMIN — MIDAZOLAM HYDROCHLORIDE 2 MG: 1 INJECTION, SOLUTION INTRAMUSCULAR; INTRAVENOUS at 02:12

## 2018-12-17 NOTE — PROGRESS NOTES
Patient completed MRI exam, patient stable, AAO, vitals returned to baseline, snacks offered, printed and verbal D/C instruction given to patient and patient's wife, pt and wife Verbalized understanding given instructions, D/C via W/C accompanied by wife and family members.

## 2018-12-17 NOTE — PROGRESS NOTES
Patient AAOX3, arrived via W/C accompanied by families Verified 2 Pt. Identifier, pre-procedure instruction given to patient and patient's accompanied ,(wife) patient instructed not to drive, make major decision, not to take any  Medication that can cause side effect of drowsiness such muscle relaxant or anxiety medications within 24 hours, verbalized understanding,  verified allergies, patient's ride for post procedure verified,  Midazolam 2mg will be  given as ordered, patient will be  Monitored during MRI exam.

## 2018-12-18 ENCOUNTER — TELEPHONE (OUTPATIENT)
Dept: NEUROSURGERY | Facility: CLINIC | Age: 43
End: 2018-12-18

## 2018-12-18 NOTE — TELEPHONE ENCOUNTER
Pt was curious about his recent MRIs. Told pt that Dr. Lisa is out of town for the rest of the year and he will review the images with him when he returns at the pt's upcoming appt. Encouraged pt to keep up with his pt portal access through [facility].  Directed pt to call MR for copies of his images. V/U. ----- Message from Savanna Mann sent at 12/18/2018  1:28 PM CST -----  Contact: Pt. [phone]  Needs Advice    Reason for call: The patient would like to speak to someone regarding his MRI. Please contact the patient to discuss further.            Communication Preference:PHONE     Additional Information:

## 2018-12-19 ENCOUNTER — TELEPHONE (OUTPATIENT)
Dept: INTERNAL MEDICINE | Facility: CLINIC | Age: 43
End: 2018-12-19

## 2018-12-19 NOTE — TELEPHONE ENCOUNTER
----- Message from Reba Ventura sent at 12/19/2018  9:31 AM CST -----  Contact: Sister, Ary call  [phone]  She want to go over the patients medication changes that Dr. Painting did on his medications.

## 2018-12-19 NOTE — TELEPHONE ENCOUNTER
spoke with sister- she wanted to speak with you on the phone,and change the pt's b/p meds that This ,and her brother recommend changes to the pt's meds. The sister was made aware that this was a appointment ,and that Dr Cotton was going to do what Dr Cotton thought was best.    appt made for tomorrow.      Dr Cotton made verbally aware of conversation with sister.

## 2018-12-20 ENCOUNTER — PATIENT MESSAGE (OUTPATIENT)
Dept: INTERNAL MEDICINE | Facility: CLINIC | Age: 43
End: 2018-12-20

## 2018-12-20 RX ORDER — LOSARTAN POTASSIUM 100 MG/1
100 TABLET ORAL DAILY
Qty: 90 TABLET | Refills: 0 | Status: SHIPPED | OUTPATIENT
Start: 2018-12-20 | End: 2020-02-21

## 2018-12-20 RX ORDER — HYDROCODONE BITARTRATE AND ACETAMINOPHEN 7.5; 325 MG/1; MG/1
1 TABLET ORAL EVERY 6 HOURS PRN
Qty: 28 TABLET | Refills: 0 | Status: SHIPPED | OUTPATIENT
Start: 2018-12-20 | End: 2019-01-10

## 2018-12-28 RX ORDER — METHOCARBAMOL 500 MG/1
TABLET, FILM COATED ORAL
Qty: 40 TABLET | Refills: 0 | Status: SHIPPED | OUTPATIENT
Start: 2018-12-28 | End: 2019-01-10

## 2018-12-28 NOTE — TELEPHONE ENCOUNTER
----- Message from Beatriz Moses sent at 12/28/2018  3:00 PM CST -----  Contact: Patient - [phone]  Marielle,      Patient [facility]  Please give a patient a call if this rx can be filled.  Patient can be reach at [phone].    Thanks Sandra

## 2019-01-02 ENCOUNTER — TELEPHONE (OUTPATIENT)
Dept: OPHTHALMOLOGY | Facility: CLINIC | Age: 44
End: 2019-01-02

## 2019-01-02 NOTE — TELEPHONE ENCOUNTER
----- Message from Eliane White sent at 1/2/2019 10:17 AM CST -----  Contact: Edgar Patton A   Pt spouse Ms.Golden would like to speak with  nurse please,she has a questions about the appointment ,she can be reached at [phone] or [phone] please thank you.

## 2019-01-04 ENCOUNTER — PATIENT MESSAGE (OUTPATIENT)
Dept: INTERNAL MEDICINE | Facility: CLINIC | Age: 44
End: 2019-01-04

## 2019-01-10 ENCOUNTER — OFFICE VISIT (OUTPATIENT)
Dept: NEUROSURGERY | Facility: CLINIC | Age: 44
End: 2019-01-10
Payer: MEDICAID

## 2019-01-10 VITALS
DIASTOLIC BLOOD PRESSURE: 90 MMHG | WEIGHT: 295 LBS | HEIGHT: 69 IN | BODY MASS INDEX: 43.69 KG/M2 | HEART RATE: 73 BPM | SYSTOLIC BLOOD PRESSURE: 170 MMHG | TEMPERATURE: 99 F

## 2019-01-10 DIAGNOSIS — G83.4 CAUDA EQUINA COMPRESSION: ICD-10-CM

## 2019-01-10 DIAGNOSIS — M47.14 THORACIC MYELOPATHY: Primary | ICD-10-CM

## 2019-01-10 PROCEDURE — 99213 OFFICE O/P EST LOW 20 MIN: CPT | Mod: PBBFAC | Performed by: NEUROLOGICAL SURGERY

## 2019-01-10 PROCEDURE — 99215 OFFICE O/P EST HI 40 MIN: CPT | Mod: S$PBB,,, | Performed by: NEUROLOGICAL SURGERY

## 2019-01-10 PROCEDURE — 99999 PR PBB SHADOW E&M-EST. PATIENT-LVL III: CPT | Mod: PBBFAC,,, | Performed by: NEUROLOGICAL SURGERY

## 2019-01-10 PROCEDURE — 99999 PR PBB SHADOW E&M-EST. PATIENT-LVL III: ICD-10-PCS | Mod: PBBFAC,,, | Performed by: NEUROLOGICAL SURGERY

## 2019-01-10 PROCEDURE — 99215 PR OFFICE/OUTPT VISIT, EST, LEVL V, 40-54 MIN: ICD-10-PCS | Mod: S$PBB,,, | Performed by: NEUROLOGICAL SURGERY

## 2019-01-10 NOTE — PROGRESS NOTES
CHIEF COMPLAINT:  Follow up after imaging    I, Curry Herrera, attest that this documentation has been prepared under the direction and in the presence of Oni Aponte MD.    HPI:  Edgar Patton is a 43 y.o.  male with history of stage 2 CKD, diabetes type 2, and malignant hypertension, on aspirin, who presents today for follow up evaluation after imaging. Pt reports improved weakness in his RLE stating that he feels as though he has more strength compared to his last visit. His BLE numbness, left worse than right, continues to wax an wane but is constantly present in some respect.       Review of patient's allergies indicates:   Allergen Reactions    Amlodipine      Leg swelling       Past Medical History:   Diagnosis Date    CKD (chronic kidney disease), stage II     Diabetes mellitus, type 2     Erectile dysfunction     Glaucoma     Hypertension     Malignant hypertension 3/16/2016    Noncompliance      History reviewed. No pertinent surgical history.  Family History   Problem Relation Age of Onset    Hypertension Mother     Diabetes Mother     Polycystic kidney disease Mother     Arthritis Mother     Hypertension Father     Diabetes Father     Polycystic kidney disease Sister     Glaucoma Brother     Amblyopia Neg Hx     Blindness Neg Hx     Cataracts Neg Hx     Macular degeneration Neg Hx     Retinal detachment Neg Hx     Strabismus Neg Hx      Social History     Tobacco Use    Smoking status: Never Smoker    Smokeless tobacco: Never Used   Substance Use Topics    Alcohol use: No     Alcohol/week: 0.0 oz    Drug use: Not on file        Review of Systems   HENT: Negative.    Eyes: Negative.    Respiratory: Negative.    Cardiovascular: Negative.    Gastrointestinal: Negative.    Endocrine: Negative.    Genitourinary: Negative.    Musculoskeletal: Negative for back pain, gait problem and neck pain.   Skin: Negative.    Allergic/Immunologic: Negative.    Neurological: Positive for  weakness (RLE) and numbness (BLE, L>R). Negative for light-headedness and headaches.   Hematological: Negative.    Psychiatric/Behavioral: Negative.        OBJECTIVE:   Vital Signs:  Temp: 98.8 °F (37.1 °C) (01/10/19 1306)  Pulse: 73 (01/10/19 1306)  BP: (!) 170/90 (01/10/19 1306)    Physical Exam:    Vital signs: All nursing notes and vital signs reviewed -- afebrile, vital signs stable.  Constitutional: Patient sitting comfortably in chair. Appears well developed and well nourished.  Skin: Exposed areas are intact without abnormal markings, rashes or other lesions.  HEENT: Normocephalic. Normal conjunctivae.  Cardiovascular: Normal rate and regular rhythm.  Respiratory: Chest wall rises and falls symmetrically, without signs of respiratory distress.  Abdomen: Soft and non-tender.  Extremities: Warm and without edema. Calves supple, non-tender.  Psych/Behavior: Normal affect.    Neurological:    Mental status: Alert and oriented. Conversational and appropriate.       Cranial Nerves: Grossly intact.     Motor:    Upper:  Deltoids Triceps Biceps WE WF     R 5/5 5/5 5/5 5/5 5/5 5/5    L 5/5 5/5 5/5 5/5 5/5 5/5      Lower:  HF KE KF DF PF EHL    R 4/5 4+/5 5/5 5/5 5/5 5/5    L 4/5 5/5 5/5 5/5 5/5 5/5     Sensory: Intact sensation to light touch in all extremities. Romberg positive.     Reflexes:          DTR: 2+ patellar, bilaterally.     Gallardo's: Negative.     Babinski's: Equivocal.     Clonus: Negative.    Cerebellar: Finger-to-nose and rapid alternating movements normal. Gait stable, fluid.    Spine:    Posture: Head well aligned over pelvis in front and side views.  No focal or global spinal deformity visible on inspection. Shoulders and hips even. No obvious leg length discrepancy. No scapula winging.    Bending: Full ROM with forward, back and lateral bending. No rib prominence with forward bend.    Cervical:      ROM: Full with flexion, extension, lateral rotation and ear-to-shoulder bend.      Midline  TTP: Negative.     Spurling's test: Negative.     Lhermitte's: Negative.    Thoracic:     Midline TTP: Negative    Lumbar:     Midline TTP: Negative     Straight Leg Test: Negative     Crossed Straight Leg Test: Negative     Sciatic notch tenderness: Negative.    Other:     SI joint TTP: Negative.     Greater trochanter TTP: Negative.     Tenderness with external/internal hip rotation: Negative.    Diagnostic Results:  All imaging was independently reviewed by me.    MRI C-spine, dated 12/17/2018:  1. OPLL involving entire cervical spine with moderate stenosis throughout  2. Severe spinal cord compression and T2 signal change at T2/T3 from OPLL and ligamentum flavum hypertrophy    MRI T-spine, dated 12/17/2018:  1. Severe stenosis from T9/10 through T12/L1  2. Moderate/severe stenosis from T7/8 through T8/9    MRI L-spine, dated 12/17/2018:  1. Giant disc calcification and OPLL at T12/L1 with 80% of the canal compromised  2. Conus appears to end dwight T12/L1 but somewhat unclear   3. Severe stenosis from T12/L1 through L4/5    CT C-spine, dated 12/6/2018:  1. Re-demonstrates OPLL  2. Element of DISH    CT T-spine, dated 12/6/2018:  1. Patchy calcification of the ligamentum flavum in addition to the OPLL  2. No scoliosis seen  3. Sagittal views are limited    AP and Lateral Scoliosis X-ray, dated 12/6/2018:   1. No scoliosis seen  2. 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 Additionally he has severe compression of the cauda equina from L1 through L4. This accounts for his BLE numbness and weakness. Because the natural history of this disease is so poor, and because he is already myelopathic my recommendation is for a posterior decompression and fusion both at T2-3, and also T7 to the pelvis. I would like to consider further pros/cons of  additionally approaching his worst level, T12-L1, from a ventral approach, but my inclination is all posterior given his enlarged polycystic kidneys in the region which could obscure access, and the risks associated with ventral approaches in OPLL. I reviewed the R/B/A/I/M of this procedure in detail with the patient and family, spending about an hour counseling them. I highlighted the substantially elevated risk of paralysis from surgery in this disease process. They wished to proceed with surgery and they will follow up 1 week prior to surgery to finalize the plan.    The patient understands and agrees with the plan of care. All questions were answered.     1. T2-3 and T7-Pelvis laminectomy and fusion  2. RTC 1 week prior to surgery      I, Dr. Oni Aponte personally performed the services described in this documentation. All medical record entries made by the scribe, Curry Herrera, were at my direction and in my presence.  I have reviewed the chart and agree that the record reflects my personal performance and is accurate and complete.      Oni Aponte M.D.  Department of Neurosurgery  [facility]      .

## 2019-01-11 ENCOUNTER — TELEPHONE (OUTPATIENT)
Dept: NEUROSURGERY | Facility: CLINIC | Age: 44
End: 2019-01-11

## 2019-01-11 DIAGNOSIS — M48.061 SPINAL STENOSIS OF LUMBAR REGION, UNSPECIFIED WHETHER NEUROGENIC CLAUDICATION PRESENT: Primary | ICD-10-CM

## 2019-01-11 DIAGNOSIS — M48.04 THORACIC STENOSIS: ICD-10-CM

## 2019-01-11 DIAGNOSIS — M47.14 THORACIC SPONDYLOSIS WITH MYELOPATHY: ICD-10-CM

## 2019-01-11 DIAGNOSIS — M47.16 LUMBAR SPONDYLOSIS WITH MYELOPATHY: ICD-10-CM

## 2019-01-11 DIAGNOSIS — M47.814 THORACIC SPONDYLOSIS WITHOUT MYELOPATHY: ICD-10-CM

## 2019-01-11 DIAGNOSIS — Z98.1 S/P SPINAL FUSION: ICD-10-CM

## 2019-01-11 DIAGNOSIS — M47.816 SPONDYLOSIS WITHOUT MYELOPATHY OR RADICULOPATHY, LUMBAR REGION: ICD-10-CM

## 2019-01-11 DIAGNOSIS — M51.04 HNP (HERNIATED NUCLEUS PULPOSUS WITH MYELOPATHY), THORACIC: ICD-10-CM

## 2019-01-11 RX ORDER — METHOCARBAMOL 500 MG/1
TABLET, FILM COATED ORAL
Qty: 40 TABLET | Refills: 0 | OUTPATIENT
Start: 2019-01-11

## 2019-01-14 ENCOUNTER — TELEPHONE (OUTPATIENT)
Dept: INTERNAL MEDICINE | Facility: CLINIC | Age: 44
End: 2019-01-14

## 2019-01-14 ENCOUNTER — TELEPHONE (OUTPATIENT)
Dept: PREADMISSION TESTING | Facility: HOSPITAL | Age: 44
End: 2019-01-14

## 2019-01-14 ENCOUNTER — ANESTHESIA EVENT (OUTPATIENT)
Dept: SURGERY | Facility: HOSPITAL | Age: 44
End: 2019-01-14

## 2019-01-14 DIAGNOSIS — Z01.818 PREOPERATIVE TESTING: Primary | ICD-10-CM

## 2019-01-14 NOTE — TELEPHONE ENCOUNTER
----- Message from Jennifer Tierney RN sent at 1/14/2019  3:01 PM CST -----  Please schedule poc appt and labs no sooner than 1/29th. Thanks!

## 2019-01-14 NOTE — TELEPHONE ENCOUNTER
Spoke to patient daughter and we booked and appointment for  Patient to come in to see Dr mcdowell on Wed.

## 2019-01-14 NOTE — TELEPHONE ENCOUNTER
----- Message from Kayla Aguilar sent at 1/14/2019  9:14 AM CST -----  Contact: Mignon Patton (Wife) [phone]  Mignon would like a call back in regards to getting a few pre-op labs scheduled for the patient before he has surgery on 02/13/19.   Lab-cbc , bmp,  pt-inr, ptt , urinalysis  Test- EKG, CXR , Chest Xray  Fax all results to [phone]

## 2019-01-14 NOTE — ANESTHESIA PREPROCEDURE EVALUATION
Anesthesia Assessment: Preoperative EQUATION  Planned Procedure: Procedure(s) (LRB):  T2-Pelvis laminectomy/ fusion** AIRO** (N/A)  Requested Anesthesia Type:General  Surgeon: Oni Aponte MD  Service: Neurosurgery  Known or anticipated Date of Surgery:2/8/2019        Surgeon notes: reviewed     Electronic QUestionnaire Assessment completed via nurse interview with patient.  NO AQ        Triage considerations:         Previous anesthesia records:None (THIS IS HIS 1ST SURGERY)     Last PCP note: within 3 months , within [facility]   Subspecialty notes: Nephrology, Neurology, OPHTHALMOLOGY, AND NEUROSURGERY     Other important co-morbidities: PER Epic: HTN, MORBID OBESITY,DM2 WITH NEPHROPATHY POLYCYSTIC KIDNEY DISEASE, CKD2, HYPERTENSIVE KIDNEY DISEASE, LUMBAR AND THORACIC SPINAL STENOSIS, BILATERAL GLAUCOMA, H/O NONCOMPLIANCE     Tests already available:  Available tests,  within 1 month , within [facility] .12/17/2018 MRI THORACIC, LUMBAR AND CERVICAL SPINE W/O CONTRAST, 12/6/2018 CT THORACIC AND CERVICAL SPINE W/O CONTRAST, XRAY SPINE SURVEY AP/LAT, 11/6/2018 PT/INR,PTT, UA, MICRO UA, CMP, CBC, & EKG, 3/21/2016 2DECHO WITH CFD   11/29/2018 OUTSIDE [facility] (IN MEDIA) STANDUP OPEN MRI THORACIC SPINE W/O CONTRAST                            Instructions given. (See in Nurse's note)     Optimization:  Anesthesia Preop Clinic Assessment  Indicated    Medical Opinion Indicated                            Plan:              Testing:  Hemoglobin, A1C, T&S and HEMATOCRIT   Pre-anesthesia  visit                                        Visit focus: concerns in complex and/or prolonged anesthesia                           Consultation:Patient's PCP for re-evaluation Patient's PCP for a statement of optimization                            Patient  has previously scheduled Medical Appointment:1/16 DR. GUILLERMINA BRUNSON, PCP, 1/30 OPHTHALMOLOGY, 2/4 URINE, LABS AND US      Navigation: Tests Scheduled. TBD                        Consults  scheduled.TBD                        Results will be tracked by Preop Clinic.                           1/14 Discussed case with Dr. Arnold. Recommended repeat H/H and get HGA1C.          Electronically signed by Jennifer Tierney RN at 1/14/2019  2:56 PM       Pre-admit on 2/12/2019            Detailed Report    2/1 Medical clearance given by Dr. Basim Cotton on 2/1.                                                                                                                     01/14/2019  Edgar Patton is a 43 y.o., male.    Pre-op Assessment      I have reviewed the Medications.   Steroids Taken In Past Year:     Review of Systems  Anesthesia Hx:  Reports no h/o anesthesia, surgery or procedure ever Neg history of prior surgery. Denies Family Hx of Anesthesia complications.    Social:  Non-Smoker, No Alcohol Use    Hematology/Oncology:  Hematology Normal   Oncology Normal     EENT/Dental:   Glaucoma, glasses   Cardiovascular:   Hypertension (/110 in POC today--pt reports rushed out without taking meds this AM; reports self check /96 yesterday), poorly controlled Denies MI.   hyperlipidemia   ECHO 2016:   CONCLUSIONS     1 - Normal left ventricular systolic function (EF 60-65%).     2 - Normal right ventricular systolic function .     3 - Left ventricular diastolic dysfunction grade I.     4 - Mild Concentric LVH.     5 - Mild left atrial enlargement.  Functional Capacity 3 METS, up until 3 months ago was working as , climbing stairs, walking; now using walker very limited due to leg numbness; denies CP, SOB    Pulmonary:   Denies Asthma.  Denies Shortness of breath.  Denies Sleep Apnea.    Renal/:   Chronic Renal Disease (CKD2, polycystic KD) renal calculi    Hepatic/GI:   GERD (prn zantac not used in months)    Musculoskeletal:   States had steroid dose shannan 12/2018 after ED visit for back pain Spine Disorders: (HNP, spondylosis, stenosis thoracic, lumbar) Chronic Pain     Neurological:   Denies CVA. Denies Seizures. LE numbness and weakness x 3 months Pain , onset is chronic , location of lower back , quality of pressing , severity is a 5    Endocrine:   Diabetes (A1C pending 2/5/19 (last 7.6 8/20/18), type 2    Psych:   anxiety          Physical Exam  General:  Morbid Obesity    Airway/Jaw/Neck:  Airway Findings: Mouth Opening: Normal Tongue: Normal  General Airway Assessment: Adult  Jaw/Neck Findings:     Neck ROM: Normal ROM  Neck Findings:  Short Neck      Dental:  Dental Findings: In tact   Chest/Lungs:  Chest/Lungs Findings: Clear to auscultation, Normal Respiratory Rate     Heart/Vascular:  Heart Findings: Rate: Normal  Rhythm: Regular Rhythm  Sounds: Normal        Mental Status:  Mental Status Findings:  Cooperative, Alert and Oriented       Pt was seen in POC 2/5/19; cleared by PCP, Dr Cotton;  findings discussed with Dr Arnold; although BP elevated today in POC pt has been followed for BP control by out of state cardiologist (whom is pt's cousin) in collaboration with nephrologist, Dr Lee; pt reports medication compliance with exception of today /Kira Art RN

## 2019-01-16 ENCOUNTER — HOSPITAL ENCOUNTER (OUTPATIENT)
Dept: RADIOLOGY | Facility: HOSPITAL | Age: 44
Discharge: HOME | End: 2019-01-16
Attending: FAMILY MEDICINE
Payer: MEDICAID

## 2019-01-16 ENCOUNTER — OFFICE VISIT (OUTPATIENT)
Dept: INTERNAL MEDICINE | Facility: CLINIC | Age: 44
End: 2019-01-16
Payer: MEDICAID

## 2019-01-16 DIAGNOSIS — N18.2 CKD (CHRONIC KIDNEY DISEASE), STAGE II: Chronic | ICD-10-CM

## 2019-01-16 DIAGNOSIS — Z91.199 NONCOMPLIANCE: Chronic | ICD-10-CM

## 2019-01-16 DIAGNOSIS — M54.5 CHRONIC LOW BACK PAIN, UNSPECIFIED BACK PAIN LATERALITY, WITH SCIATICA PRESENCE UNSPECIFIED: ICD-10-CM

## 2019-01-16 DIAGNOSIS — I12.9 HYPERTENSIVE KIDNEY DISEASE WITH STAGE 2 CHRONIC KIDNEY DISEASE: Chronic | ICD-10-CM

## 2019-01-16 DIAGNOSIS — Z01.818 PRE-OP EXAM: ICD-10-CM

## 2019-01-16 DIAGNOSIS — E66.01 MORBID OBESITY: Chronic | ICD-10-CM

## 2019-01-16 DIAGNOSIS — N18.2 HYPERTENSIVE KIDNEY DISEASE WITH STAGE 2 CHRONIC KIDNEY DISEASE: Chronic | ICD-10-CM

## 2019-01-16 DIAGNOSIS — N52.9 ERECTILE DYSFUNCTION, UNSPECIFIED ERECTILE DYSFUNCTION TYPE: Chronic | ICD-10-CM

## 2019-01-16 DIAGNOSIS — Q61.2 POLYCYSTIC KIDNEY DISEASE, AUTOSOMAL DOMINANT: Chronic | ICD-10-CM

## 2019-01-16 DIAGNOSIS — E11.9 DM TYPE 2 WITHOUT RETINOPATHY: Chronic | ICD-10-CM

## 2019-01-16 DIAGNOSIS — E11.9 TYPE 2 DIABETES MELLITUS WITHOUT COMPLICATION, WITHOUT LONG-TERM CURRENT USE OF INSULIN: Chronic | ICD-10-CM

## 2019-01-16 DIAGNOSIS — Z01.818 PRE-OP EXAM: Primary | ICD-10-CM

## 2019-01-16 DIAGNOSIS — R29.898 WEAKNESS OF RIGHT LOWER EXTREMITY: ICD-10-CM

## 2019-01-16 DIAGNOSIS — I51.89 DIASTOLIC DYSFUNCTION WITHOUT HEART FAILURE: Chronic | ICD-10-CM

## 2019-01-16 DIAGNOSIS — G89.29 CHRONIC LOW BACK PAIN, UNSPECIFIED BACK PAIN LATERALITY, WITH SCIATICA PRESENCE UNSPECIFIED: ICD-10-CM

## 2019-01-16 DIAGNOSIS — E11.21 DIABETIC NEPHROPATHY ASSOCIATED WITH TYPE 2 DIABETES MELLITUS: Chronic | ICD-10-CM

## 2019-01-16 DIAGNOSIS — I10 ESSENTIAL HYPERTENSION: Chronic | ICD-10-CM

## 2019-01-16 DIAGNOSIS — R20.0 LOWER EXTREMITY NUMBNESS: ICD-10-CM

## 2019-01-16 PROCEDURE — 99999 PR PBB SHADOW E&M-EST. PATIENT-LVL III: ICD-10-PCS | Mod: PBBFAC,,, | Performed by: FAMILY MEDICINE

## 2019-01-16 PROCEDURE — 93010 ELECTROCARDIOGRAM REPORT: CPT | Mod: S$PBB,,, | Performed by: INTERNAL MEDICINE

## 2019-01-16 PROCEDURE — 99999 PR PBB SHADOW E&M-EST. PATIENT-LVL III: CPT | Mod: PBBFAC,,, | Performed by: FAMILY MEDICINE

## 2019-01-16 PROCEDURE — 71046 X-RAY EXAM CHEST 2 VIEWS: CPT | Mod: 26,,, | Performed by: RADIOLOGY

## 2019-01-16 PROCEDURE — 71046 XR CHEST PA AND LATERAL: ICD-10-PCS | Mod: 26,,, | Performed by: RADIOLOGY

## 2019-01-16 PROCEDURE — 99214 PR OFFICE/OUTPT VISIT, EST, LEVL IV, 30-39 MIN: ICD-10-PCS | Mod: S$PBB,,, | Performed by: FAMILY MEDICINE

## 2019-01-16 PROCEDURE — 93010 EKG 12-LEAD: ICD-10-PCS | Mod: S$PBB,,, | Performed by: INTERNAL MEDICINE

## 2019-01-16 PROCEDURE — 71046 X-RAY EXAM CHEST 2 VIEWS: CPT | Mod: TC,PO

## 2019-01-16 PROCEDURE — 93005 ELECTROCARDIOGRAM TRACING: CPT | Mod: PBBFAC,PO | Performed by: INTERNAL MEDICINE

## 2019-01-16 PROCEDURE — 99214 OFFICE O/P EST MOD 30 MIN: CPT | Mod: S$PBB,,, | Performed by: FAMILY MEDICINE

## 2019-01-16 PROCEDURE — 99213 OFFICE O/P EST LOW 20 MIN: CPT | Mod: PBBFAC,25,PO | Performed by: FAMILY MEDICINE

## 2019-01-17 ENCOUNTER — TELEPHONE (OUTPATIENT)
Dept: INTERNAL MEDICINE | Facility: CLINIC | Age: 44
End: 2019-01-17

## 2019-01-17 RX ORDER — METHOCARBAMOL 750 MG/1
750 TABLET, FILM COATED ORAL
Qty: 30 TABLET | Refills: 0 | Status: SHIPPED | OUTPATIENT
Start: 2019-01-17 | End: 2019-01-29 | Stop reason: SDUPTHER

## 2019-01-17 RX ORDER — HYDROCODONE BITARTRATE AND ACETAMINOPHEN 7.5; 325 MG/1; MG/1
1 TABLET ORAL EVERY 8 HOURS PRN
Qty: 20 TABLET | Refills: 0 | Status: SHIPPED | OUTPATIENT
Start: 2019-01-17 | End: 2019-01-18 | Stop reason: SDUPTHER

## 2019-01-17 RX ORDER — CYCLOBENZAPRINE HCL 10 MG
10 TABLET ORAL NIGHTLY PRN
Qty: 20 TABLET | Refills: 0 | Status: SHIPPED | OUTPATIENT
Start: 2019-01-17 | End: 2019-01-29 | Stop reason: SDUPTHER

## 2019-01-17 NOTE — TELEPHONE ENCOUNTER
----- Message from Delfina Burroughs sent at 1/17/2019 12:14 PM CST -----  Contact: [facility]  [phone]  Pharmacy is calling to clarify an RX.  RX name:  HYDROcodone-acetaminophen (NORCO) 7.5-325 mg per tablet  What do they need to clarify:  They need a diagnosis code in order to fill the rx  Comments:

## 2019-01-18 ENCOUNTER — PATIENT MESSAGE (OUTPATIENT)
Dept: INTERNAL MEDICINE | Facility: CLINIC | Age: 44
End: 2019-01-18

## 2019-01-18 VITALS
HEIGHT: 69 IN | DIASTOLIC BLOOD PRESSURE: 86 MMHG | TEMPERATURE: 98 F | SYSTOLIC BLOOD PRESSURE: 124 MMHG | WEIGHT: 280.19 LBS | BODY MASS INDEX: 41.5 KG/M2 | HEART RATE: 70 BPM

## 2019-01-18 PROBLEM — M54.50 CHRONIC LOW BACK PAIN: Status: ACTIVE | Noted: 2019-01-18

## 2019-01-18 PROBLEM — G89.29 CHRONIC LOW BACK PAIN: Status: ACTIVE | Noted: 2019-01-18

## 2019-01-18 RX ORDER — HYDROCODONE BITARTRATE AND ACETAMINOPHEN 7.5; 325 MG/1; MG/1
1 TABLET ORAL EVERY 8 HOURS PRN
Qty: 20 TABLET | Refills: 0 | OUTPATIENT
Start: 2019-01-18 | End: 2021-08-23

## 2019-01-18 NOTE — PROGRESS NOTES
Subjective:   Patient ID: Edgar Patton is a 43 y.o. male.    Chief Complaint: Pre-op Exam (back surgery)      HPI  42 yo with type 2 diabetes mellitus, essential hypertension and   Autosomal dominant polycystic kidney disease and ho noncompliance here w wife for pre-op eval    Patient queried and denies any further complaints.      ALLERGIES AND MEDICATIONS: updated and reviewed.  Review of patient's allergies indicates:   Allergen Reactions    Amlodipine      Leg swelling       Current Outpatient Medications:     carvedilol (COREG) 25 MG tablet, 1.5 tabs po bid, Disp: 270 tablet, Rfl: 1    ergocalciferol (ERGOCALCIFEROL) 50,000 unit Cap, Take 1 capsule (50,000 Units total) by mouth every 7 days., Disp: 8 capsule, Rfl: 0    losartan (COZAAR) 100 MG tablet, Take 1 tablet (100 mg total) by mouth once daily. Pt will stop losartan and is aware, Disp: 90 tablet, Rfl: 0    metFORMIN (GLUCOPHAGE) 1000 MG tablet, Take 1 tablet (1,000 mg total) by mouth 2 (two) times daily with meals., Disp: 180 tablet, Rfl: 1    NIFEdipine (PROCARDIA XL) 30 MG (OSM) 24 hr tablet, Take 1 tablet (30 mg total) by mouth once daily., Disp: 30 tablet, Rfl: 11    rosuvastatin (CRESTOR) 10 MG tablet, Take 1 tablet (10 mg total) by mouth once daily., Disp: 90 tablet, Rfl: 1    aspirin (ECOTRIN) 81 MG EC tablet, Take 81 mg by mouth once daily., Disp: , Rfl:     cyclobenzaprine (FLEXERIL) 10 MG tablet, Take 1 tablet (10 mg total) by mouth nightly as needed for Muscle spasms., Disp: 20 tablet, Rfl: 0    HYDROcodone-acetaminophen (NORCO) 7.5-325 mg per tablet, Take 1 tablet by mouth every 8 (eight) hours as needed (for chronic low back pain; surgery pending)., Disp: 20 tablet, Rfl: 0    latanoprost 0.005 % ophthalmic solution, Place 1 drop into both eyes nightly., Disp: 2.5 mL, Rfl: 6    methocarbamol (ROBAXIN) 750 MG Tab, Take 1 tablet (750 mg total) by mouth 3 (three) times daily with meals. for 10 days, Disp: 30 tablet, Rfl: 0     "potassium chloride SA (K-DUR,KLOR-CON) 10 MEQ tablet, Take 1 tablet (10 mEq total) by mouth 2 (two) times daily., Disp: 30 tablet, Rfl: 1    sildenafil (VIAGRA) 100 MG tablet, Take 1 tablet (100 mg total) by mouth as needed for Erectile Dysfunction., Disp: 30 tablet, Rfl: 11    Review of Systems   Constitutional: Negative for activity change, appetite change, chills, diaphoresis, fatigue, fever and unexpected weight change.   HENT: Negative for congestion, ear discharge, ear pain, facial swelling, hearing loss, nosebleeds, postnasal drip, rhinorrhea, sinus pressure, sneezing, sore throat, tinnitus, trouble swallowing and voice change.    Eyes: Negative for photophobia, pain, discharge, redness, itching and visual disturbance.   Respiratory: Negative for cough, chest tightness, shortness of breath and wheezing.    Cardiovascular: Negative for chest pain, palpitations and leg swelling.   Gastrointestinal: Negative for abdominal distention, abdominal pain, anal bleeding, blood in stool, constipation, diarrhea, nausea, rectal pain and vomiting.   Endocrine: Negative for cold intolerance, heat intolerance, polydipsia, polyphagia and polyuria.   Genitourinary: Negative for difficulty urinating, dysuria and flank pain.   Musculoskeletal: Positive for back pain. Negative for arthralgias, joint swelling, myalgias and neck pain.   Skin: Negative for rash.   Neurological: Positive for weakness. Negative for dizziness, tremors, seizures, syncope, speech difficulty, light-headedness, numbness and headaches.   Psychiatric/Behavioral: Negative for behavioral problems, confusion, decreased concentration, dysphoric mood, sleep disturbance and suicidal ideas. The patient is not nervous/anxious and is not hyperactive.        Objective:     Vitals:    01/16/19 1551   BP: 124/86   Pulse: 70   Temp: 98.2 °F (36.8 °C)   TempSrc: Oral   Weight: 127.1 kg (280 lb 3.3 oz)   Height: 5' 9" (1.753 m)   PainSc:   7   PainLoc: Back     Body mass " index is 41.38 kg/m².    Physical Exam   Constitutional: He is oriented to person, place, and time. He appears well-developed and well-nourished. He is cooperative. He does not have a sickly appearance. No distress.   HENT:   Head: Normocephalic and atraumatic.   Right Ear: Hearing, tympanic membrane, external ear and ear canal normal. No tenderness.   Left Ear: Hearing, tympanic membrane, external ear and ear canal normal. No tenderness.   Nose: Nose normal.   Mouth/Throat: Oropharynx is clear and moist.   Eyes: Conjunctivae and lids are normal. Pupils are equal, round, and reactive to light. Right eye exhibits no discharge. Left eye exhibits no discharge. Right conjunctiva is not injected. Left conjunctiva is not injected. No scleral icterus. Right eye exhibits normal extraocular motion. Left eye exhibits normal extraocular motion.   Neck: Normal range of motion. Neck supple. No JVD present. Carotid bruit is not present. No tracheal deviation and no edema present. No thyromegaly present.   Cardiovascular: Normal rate, regular rhythm, normal heart sounds and normal pulses. Exam reveals no friction rub.   No murmur heard.  Pulmonary/Chest: Effort normal and breath sounds normal. No accessory muscle usage. No respiratory distress. He has no wheezes. He has no rhonchi. He has no rales.   Abdominal: Soft. Bowel sounds are normal. He exhibits no distension, no abdominal bruit, no pulsatile midline mass and no mass. There is no hepatosplenomegaly. There is no tenderness. There is no rebound, no guarding, no CVA tenderness, no tenderness at McBurney's point and negative Ren's sign.   Musculoskeletal: He exhibits no edema.   Lymphadenopathy:        Head (right side): No submandibular, no preauricular and no posterior auricular adenopathy present.        Head (left side): No submandibular, no preauricular and no posterior auricular adenopathy present.     He has no cervical adenopathy.   Neurological: He is alert and  oriented to person, place, and time. GCS eye subscore is 4. GCS verbal subscore is 5. GCS motor subscore is 6.   Skin: Skin is warm and dry. No ecchymosis and no rash noted. Rash is not maculopapular and not urticarial. He is not diaphoretic. No cyanosis or erythema. Nails show no clubbing.   Psychiatric: He has a normal mood and affect. His speech is normal and behavior is normal. Thought content normal. His mood appears not anxious. His affect is not angry and not inappropriate. He does not exhibit a depressed mood.   Nursing note and vitals reviewed.      Assessment and Plan:   Edgar was seen today for pre-op exam.    Diagnoses and all orders for this visit:    Pre-op exam  -     X-Ray Chest PA And Lateral; Future  -     SCHEDULED EKG 12-LEAD (to Muse); Future  -     CBC auto differential; Future  -     Comprehensive metabolic panel; Future  -     Protime-INR; Future  -     APTT; Future  -     Urinalysis; Future  -     SCHEDULED EKG 12-LEAD (to Muse)    Noncompliance    Diastolic dysfunction without heart failure    Essential hypertension    Polycystic kidney disease, autosomal dominant    CKD (chronic kidney disease), stage II    Diabetic nephropathy associated with type 2 diabetes mellitus    Erectile dysfunction, unspecified erectile dysfunction type    Hypertensive kidney disease with stage 2 chronic kidney disease    Type 2 diabetes mellitus without complication, without long-term current use of insulin    Morbid obesity    DM type 2 without retinopathy    Weakness of right lower extremity    Lower extremity numbness    Chronic low back pain, unspecified back pain laterality, with sciatica presence unspecified    Other orders  -     methocarbamol (ROBAXIN) 750 MG Tab; Take 1 tablet (750 mg total) by mouth 3 (three) times daily with meals. for 10 days  -     cyclobenzaprine (FLEXERIL) 10 MG tablet; Take 1 tablet (10 mg total) by mouth nightly as needed for Muscle spasms.  -     HYDROcodone-acetaminophen  (NORCO) 7.5-325 mg per tablet; Take 1 tablet by mouth every 8 (eight) hours as needed (for chronic low back pain; surgery pending).        No Follow-up on file.    THIS NOTE WILL BE SHARED WITH THE PATIENT.

## 2019-01-22 ENCOUNTER — PATIENT MESSAGE (OUTPATIENT)
Dept: SURGERY | Facility: HOSPITAL | Age: 44
End: 2019-01-22

## 2019-01-22 ENCOUNTER — TELEPHONE (OUTPATIENT)
Dept: INTERNAL MEDICINE | Facility: CLINIC | Age: 44
End: 2019-01-22

## 2019-01-22 ENCOUNTER — TELEPHONE (OUTPATIENT)
Dept: NEUROSURGERY | Facility: CLINIC | Age: 44
End: 2019-01-22

## 2019-01-22 DIAGNOSIS — Z01.818 PRE-OP EXAM: Primary | ICD-10-CM

## 2019-01-22 NOTE — TELEPHONE ENCOUNTER
----- Message from Chel Parsons sent at 1/22/2019 11:10 AM CST -----  Contact: Pravin @ [facility] Direct# [phone], Fax# [phone]  Pravin is calling in regards to wanting to know if the patient's medical clearance form for spinal surgery is completed? He would like to know what is the status on it please?       Attn: Nurse Kalli Alba: Direct# [phone], Ext#[phone]

## 2019-01-23 ENCOUNTER — TELEPHONE (OUTPATIENT)
Dept: NEUROSURGERY | Facility: CLINIC | Age: 44
End: 2019-01-23

## 2019-01-23 NOTE — TELEPHONE ENCOUNTER
Pt has requested that Dr. Aponte amend the Sx procedure into two separate steps and not one fusion.  Dr. Aponte has agreed to discuss the pt's concerns on the morning of the Sx.  V/U.

## 2019-01-23 NOTE — TELEPHONE ENCOUNTER
----- Message from Rell Tyler sent at 1/23/2019  9:33 AM CST -----  Contact: Mignon ( spouse ) @ [phone]  Caller requesting a return call regarding surgery, pls return call

## 2019-01-23 NOTE — TELEPHONE ENCOUNTER
Left message on patient voicemail that he needs to do a urine specimen to be cleared for his surgery.

## 2019-01-24 ENCOUNTER — TELEPHONE (OUTPATIENT)
Dept: INTERNAL MEDICINE | Facility: CLINIC | Age: 44
End: 2019-01-24

## 2019-01-24 NOTE — TELEPHONE ENCOUNTER
----- Message from Brit Gómez sent at 1/24/2019 12:59 PM CST -----  Contact: Pravin from [facility] [phone] ext [phone]   Calling to see if pt has been cleared for surgery. Please advise.

## 2019-01-25 ENCOUNTER — TELEPHONE (OUTPATIENT)
Dept: NEUROSURGERY | Facility: CLINIC | Age: 44
End: 2019-01-25

## 2019-01-25 NOTE — TELEPHONE ENCOUNTER
Pt claims he forgot to get his urinalysis done and assured me he would take care of it on Monday.  Pt will also reach out to his Nephrologist to make sure he is clear with his kidneys. Encouraged pt to continue to eat well. Pt also claims that he is not exploring any other options, looking forward to his Sx date on 02.12.2019.  V/U.

## 2019-01-28 ENCOUNTER — LAB VISIT (OUTPATIENT)
Dept: LAB | Facility: HOSPITAL | Age: 44
End: 2019-01-28
Attending: FAMILY MEDICINE
Payer: MEDICAID

## 2019-01-28 ENCOUNTER — PATIENT MESSAGE (OUTPATIENT)
Dept: INTERNAL MEDICINE | Facility: CLINIC | Age: 44
End: 2019-01-28

## 2019-01-28 DIAGNOSIS — Z01.818 PRE-OP EXAM: ICD-10-CM

## 2019-01-28 DIAGNOSIS — R53.81 DEBILITATED PATIENT: Primary | ICD-10-CM

## 2019-01-28 LAB
BILIRUB UR QL STRIP: NEGATIVE
CLARITY UR REFRACT.AUTO: CLEAR
COLOR UR AUTO: ABNORMAL
GLUCOSE UR QL STRIP: NEGATIVE
HGB UR QL STRIP: ABNORMAL
KETONES UR QL STRIP: NEGATIVE
LEUKOCYTE ESTERASE UR QL STRIP: NEGATIVE
MICROSCOPIC COMMENT: ABNORMAL
NITRITE UR QL STRIP: NEGATIVE
PH UR STRIP: 7 [PH] (ref 5–8)
PROT UR QL STRIP: NEGATIVE
RBC #/AREA URNS AUTO: 7 /HPF (ref 0–4)
SP GR UR STRIP: 1.01 (ref 1–1.03)
SQUAMOUS #/AREA URNS AUTO: 1 /HPF
URN SPEC COLLECT METH UR: ABNORMAL

## 2019-01-28 PROCEDURE — 81001 URINALYSIS AUTO W/SCOPE: CPT

## 2019-01-29 ENCOUNTER — TELEPHONE (OUTPATIENT)
Dept: NEPHROLOGY | Facility: CLINIC | Age: 44
End: 2019-01-29

## 2019-01-29 ENCOUNTER — PATIENT MESSAGE (OUTPATIENT)
Dept: INTERNAL MEDICINE | Facility: CLINIC | Age: 44
End: 2019-01-29

## 2019-01-29 RX ORDER — CYCLOBENZAPRINE HCL 10 MG
10 TABLET ORAL NIGHTLY PRN
Qty: 20 TABLET | Refills: 0 | Status: SHIPPED | OUTPATIENT
Start: 2019-01-29 | End: 2019-03-07 | Stop reason: SDUPTHER

## 2019-01-29 RX ORDER — METHOCARBAMOL 750 MG/1
750 TABLET, FILM COATED ORAL
Qty: 30 TABLET | Refills: 0 | Status: SHIPPED | OUTPATIENT
Start: 2019-01-29 | End: 2019-02-08

## 2019-01-29 NOTE — TELEPHONE ENCOUNTER
----- Message from Andreas Sibley sent at 1/29/2019  9:59 AM CST -----  Contact: Spouse/ Mignon [phone]  Telephone consult    She wants to speak with you about the cramps in his legs, and calf muscles being sore    Thank you

## 2019-02-01 ENCOUNTER — DOCUMENTATION ONLY (OUTPATIENT)
Dept: INTERNAL MEDICINE | Facility: CLINIC | Age: 44
End: 2019-02-01

## 2019-02-01 ENCOUNTER — TELEPHONE (OUTPATIENT)
Dept: INTERNAL MEDICINE | Facility: CLINIC | Age: 44
End: 2019-02-01

## 2019-02-01 ENCOUNTER — TELEPHONE (OUTPATIENT)
Dept: NEUROSURGERY | Facility: CLINIC | Age: 44
End: 2019-02-01

## 2019-02-01 NOTE — TELEPHONE ENCOUNTER
----- Message from Jennifer Tierney RN sent at 2/1/2019 11:18 AM CST -----  Patient was seen by you on 1/16 for preop clearance. Is he cleared? Patients surgery is being moved up to 2/8. Please update your note. Thanks!

## 2019-02-01 NOTE — PROGRESS NOTES
"MARCO ANTONIO Solano MD   Cc: Jennifer Tierney RN             As far as I know he is having it with [facility], they just moved the date up to 2/8. Thanks so much!    Previous Messages      ----- Message -----   From: Basim Cotton MD   Sent: 2/1/2019  11:37 AM   To: Jennifer Tierney RN     Hi!     Mr. Patton is often a challenging patient for me. More, he has indicated to me just last week that he was not doing surgery with you all but [facility] [place]. I documented in an Epic email to him that I wanted him to seriously reconsider that plan and consider surgery with [facility] bc of post-op care and pain m'ment, etc.     Do you know if he is "playing both sides" or is he committed to doing surgery with you all now?     I sent a fax--after his numerous requests--to the [facility] and surgery was supposedly scheduled with them later this month.     I will add an additional note now. He may proceed with general anesthesia and surgery at low cv risk.     Thank you for your patience and help.     Basim Cotton MD   ----- Message -----   From: Jennifer Tierney RN   Sent: 2/1/2019  11:18 AM   To: Jennifer Tierney RN, Basim Cotton MD, *     Patient was seen by you on 1/16 for preop clearance. Is he cleared? Patients surgery is being moved up to 2/8. Please update your note. Thanks!             "

## 2019-02-04 ENCOUNTER — LAB VISIT (OUTPATIENT)
Dept: LAB | Facility: HOSPITAL | Age: 44
End: 2019-02-04
Attending: INTERNAL MEDICINE
Payer: MEDICAID

## 2019-02-04 ENCOUNTER — TELEPHONE (OUTPATIENT)
Dept: NEUROSURGERY | Facility: CLINIC | Age: 44
End: 2019-02-04

## 2019-02-04 ENCOUNTER — HOSPITAL ENCOUNTER (OUTPATIENT)
Dept: RADIOLOGY | Facility: HOSPITAL | Age: 44
Discharge: HOME | End: 2019-02-04
Attending: INTERNAL MEDICINE
Payer: MEDICAID

## 2019-02-04 DIAGNOSIS — Q61.2 POLYCYSTIC KIDNEY DISEASE, AUTOSOMAL DOMINANT: Chronic | ICD-10-CM

## 2019-02-04 DIAGNOSIS — R31.29 MICROSCOPIC HEMATURIA: ICD-10-CM

## 2019-02-04 DIAGNOSIS — R31.9 HEMATURIA, UNSPECIFIED TYPE: Primary | ICD-10-CM

## 2019-02-04 LAB
BACTERIA #/AREA URNS AUTO: ABNORMAL /HPF
BILIRUB UR QL STRIP: NEGATIVE
CLARITY UR REFRACT.AUTO: ABNORMAL
COLOR UR AUTO: YELLOW
CREAT UR-MCNC: 153.9 MG/DL
GLUCOSE UR QL STRIP: ABNORMAL
HGB UR QL STRIP: ABNORMAL
HYALINE CASTS UR QL AUTO: 0 /LPF
KETONES UR QL STRIP: NEGATIVE
LEUKOCYTE ESTERASE UR QL STRIP: NEGATIVE
MICROSCOPIC COMMENT: ABNORMAL
NITRITE UR QL STRIP: NEGATIVE
PH UR STRIP: 5 [PH] (ref 5–8)
PROT UR QL STRIP: ABNORMAL
PROT UR-MCNC: 27 MG/DL
PROT/CREAT UR: 0.18 MG/G{CREAT}
RBC #/AREA URNS AUTO: 100 /HPF (ref 0–4)
SP GR UR STRIP: 1.02 (ref 1–1.03)
URATE CRY UR QL COMP ASSIST: ABNORMAL
URN SPEC COLLECT METH UR: ABNORMAL
UROBILINOGEN UR STRIP-ACNC: NEGATIVE EU/DL
WBC #/AREA URNS AUTO: 1 /HPF (ref 0–5)

## 2019-02-04 PROCEDURE — 81000 URINALYSIS NONAUTO W/SCOPE: CPT | Mod: PO

## 2019-02-04 PROCEDURE — 82570 ASSAY OF URINE CREATININE: CPT

## 2019-02-04 PROCEDURE — 76770 US EXAM ABDO BACK WALL COMP: CPT | Mod: TC,PO

## 2019-02-05 ENCOUNTER — DOCUMENTATION ONLY (OUTPATIENT)
Dept: INTERNAL MEDICINE | Facility: CLINIC | Age: 44
End: 2019-02-05

## 2019-02-05 ENCOUNTER — HOSPITAL ENCOUNTER (OUTPATIENT)
Dept: PREADMISSION TESTING | Facility: HOSPITAL | Age: 44
Discharge: HOME | End: 2019-02-05
Attending: ANESTHESIOLOGY
Payer: MEDICAID

## 2019-02-05 ENCOUNTER — TELEPHONE (OUTPATIENT)
Dept: INTERNAL MEDICINE | Facility: CLINIC | Age: 44
End: 2019-02-05

## 2019-02-05 ENCOUNTER — TELEPHONE (OUTPATIENT)
Dept: NEUROSURGERY | Facility: CLINIC | Age: 44
End: 2019-02-05

## 2019-02-05 VITALS
TEMPERATURE: 99 F | BODY MASS INDEX: 43.81 KG/M2 | OXYGEN SATURATION: 99 % | HEART RATE: 72 BPM | HEIGHT: 67 IN | DIASTOLIC BLOOD PRESSURE: 110 MMHG | WEIGHT: 279.13 LBS | SYSTOLIC BLOOD PRESSURE: 180 MMHG

## 2019-02-05 NOTE — TELEPHONE ENCOUNTER
Explained it is pertinent that the pt make the appt today in the event that further testing be required tomorrow.  Pt has experienced challenges with approval so it is necessary for him to make the appt. Pt's Sx is Friday. Informed pt that the next available Sx date is on May 2019, they stated they so not want to wait that long. V/U. ----- Message from Lukasz Ulloa sent at 2/5/2019  8:27 AM CST -----  Contact: pt wife  Pt wife called in about wanting to rs pre-admit for tomorrow.Pt wife stated that pt cant make it        Pt wife can be reached at [phone]        TY

## 2019-02-05 NOTE — TELEPHONE ENCOUNTER
----- Message from Basim Cotton MD sent at 2/5/2019 10:03 AM CST -----  Contact: self   Is he kidding? I've done referrals and pre-ops for TWO different neurosurgeons and it seems to me he led both to believe surgery with each was imminent. He can call the clinic himself and see if they will see him without a referral and then he can request his own records. Ok to send to pt in modified form. Challenging pt and family. Thank you  ----- Message -----  From: Darinel Major  Sent: 2/5/2019   9:45 AM  To: Yury Stallworth Staff    Patient would like to get a referral.  Does the patient already have the specialty clinic appointment scheduled:  no  If yes, what date is the appointment scheduled:   No   Referral to what specialty:Neuro Surgery     Reason (be specific):second opinion   Did you confirm the insurance currently in Epic is correct (important for a referral):    Does the patient want the referral with a specific physician:  Dr. Vinicio Lane @ [address]  Fax   Is the [facility]: [facility]   Comments:  Pt would like to speak to nurse

## 2019-02-05 NOTE — DISCHARGE INSTRUCTIONS
Your surgery has been scheduled for:__________________________________________    You should report to:  ____[facility], located on the [place] side of the first floor of the           [facility] ([phone])  ____[facility], located on the [facility] side of the            Second floor of the [facility] ([phone])  ____3rd Floor [facility] located on the [facility] side of the [facility] [phone]  Please Note   - Tell your doctor if you take Aspirin, products containing Aspirin, herbal medications  or blood thinners, such as Coumadin, Ticlid, or Plavix.  (Consult your provider regarding holding or stopping before surgery).  - Arrange for someone to drive you home following surgery.  You will not be allowed to leave the surgical facility alone or drive yourself home following sedation and anesthesia.  Before Surgery  - Stop taking all herbal medications 14days prior to surgery  - No Motrin/Advil (Ibuprofen) 7 days before surgery  - No Aleve (Naproxen) 7 days before surgery  - Stop Taking Asprin, products containing Asprin _____days before surgery  - Stop taking blood thinners_______days before surgery  - No Goody's/BC  Powder 7 days before surgery  - Refrain from drinking alcoholic beverages for 24hours before and after surgery  - Stop or limit smoking _________days before surgery  - You may take Tylenol for pain    Night before Surgery  NOTHING TO EAT OR DRINK AFTER MIDNIGHT OR FOLLOW SURGEON'S INSTRUCTIONS  - Take a shower or bath (shower is recommended).  Bathe with Hibiclens soap or an antibacterial soap from the neck down.  If not supplied by your surgeon, hibiclens soap will need to be purchased over the counter in pharmacy.  Rinse soap off thoroughly.  - Shampoo your hair with your regular shampoo  The Day of Surgery  ·  If you are told to take medication on the morning of surgery, it may be taken with  a sip of water.   - Take another bath or shower with hibiclens or any antibacterial soap, to reduce the chance of infection.  - Take heart and blood pressure medications with a small sip of water, as advised by the perioperative team.  - Do not take fluid pills  - You may brush your teeth and rinse your mouth, but do not swall any additional water.   - Do not apply perfumes, powder, body lotions or deodorant on the day of surgery.  - Nail polish should be removed.  - Do not wear makeup or moisturizer  - Wear comfortable clothes, such as a button front shirt and loose fitting pants.  - Leave all jewelry, including body piercings, and valuables at home.    - Bring any devices you will neeed after surgery such as crutches or canes.  - If you have sleep apnea, please bring your CPAP machine  In the event that your physical condition changes including the onset of a cold or respiratory illness, or if you have to delay or cancel your surgery, please notify your surgeon.      Anesthesia: General Anesthesia  Youre due to have surgery. During surgery, youll be given medication called anesthesia. (It is also called anesthetic.) This will keep you comfortable and pain-free. Your anesthesia provider will use general anesthesia. This sheet tells you more about it.  What is general anesthesia?     You are watched continuously during your procedure by the anesthesia provider   General anesthesia puts you into a state like deep sleep. It goes into the bloodstream (IV anesthetics), into the lungs (gas anesthetics), or both. You feel nothing during the procedure. You will not remember it. During the procedure, the anesthesia provider monitors you continuously. He or she checks your heart rate and rhythm, blood pressure, breathing, and blood oxygen.  · IV Anesthetics. IV anesthetics are given through an IV line in your arm. Theyre often given first. This is so you are asleep before a gas anesthetic is started. Some kinds of IV  anesthetics relieve pain. Others relax you. Your doctor will decide which kind is best in your case.  · Gas Anesthetics. Gas anesthetics are breathed into the lungs. They are often used to keep you asleep. They can be given through a facemask or a tube placed in your larynx or trachea (breathing tube).  ? If you have a facemask, your anesthesia provider will most likely place it over your nose and mouth while youre still awake. Youll breathe oxygen through the mask as your IV anesthetic is started. Gas anesthetic may be added through the mask.  ? If you have a tube in the larynx or trachea, it will be inserted into your throat after youre asleep.  Anesthesia tools and medications  You will likely have:  · IV anesthetics. These are put into an IV line into your bloodstream.  · Gas anesthetics. You breathe these anesthetics into your lungs, where they pass into your bloodstream.  · Pulse oximeter. This is a small clip that is attached to the end of your finger. This measures your blood oxygen level.  · Electrocardiography leads (electrodes). These are small sticky pads that are placed on your chest. They record your heart rate and rhythm.  · Blood pressure cuff. This reads your blood pressure.  Risks and possible complications  General anesthesia has some risks. These include:  · Breathing problems  · Nausea and vomiting  · Sore throat or hoarseness (usually temporary)  · Allergic reaction to the anesthetic  · Irregular heartbeat (rare)  · Cardiac arrest (rare)   Anesthesia safety  · Follow all instructions you are given for how long not to eat or drink before your procedure.  · Be sure your doctor knows what medications and drugs you take. This includes over-the-counter medications, herbs, supplements, alcohol or other drugs. You will be asked when those were last taken.  · Have an adult family member or friend drive you home after the procedure.  · For the first 24 hours after your surgery:  ? Do not drive or use  heavy equipment.  ? Have a trusted family member or spouse make important decisions or sign documents.  ? Avoid alcohol.  ? Have a responsible adult stay with you. He or she can watch for problems and help keep you safe.  Date Last Reviewed: 10/16/2014  © 1496-8106 [employer]. [address] [place] [address]. All rights reserved. This information is not intended as a substitute for professional medical care. Always follow your healthcare professional's instructions.

## 2019-02-06 ENCOUNTER — TELEPHONE (OUTPATIENT)
Dept: NEUROSURGERY | Facility: CLINIC | Age: 44
End: 2019-02-06

## 2019-02-06 NOTE — TELEPHONE ENCOUNTER
Pt has decided to cancel his Sx scheduled for Friday.  Pt states he would like to explore other opinions.  Encouraged pt to what he felt was best and in the event that he would like to come back to [facility] for his Sx, we would be happy to have him.  V/U.

## 2019-02-12 ENCOUNTER — ANESTHESIA (OUTPATIENT)
Dept: SURGERY | Facility: HOSPITAL | Age: 44
End: 2019-02-12

## 2019-02-13 ENCOUNTER — PATIENT MESSAGE (OUTPATIENT)
Dept: INTERNAL MEDICINE | Facility: CLINIC | Age: 44
End: 2019-02-13

## 2019-02-13 ENCOUNTER — TELEPHONE (OUTPATIENT)
Dept: INTERNAL MEDICINE | Facility: CLINIC | Age: 44
End: 2019-02-13

## 2019-02-13 NOTE — TELEPHONE ENCOUNTER
----- Message from Jada Abbott sent at 2/13/2019  3:35 PM CST -----  Contact: self/ [phone]  Patient would like to get a referral.  Does the patient already have the specialty clinic appointment scheduled:  no  If yes, what date is the appointment scheduled:     Referral to what specialty: pain management  Reason (be specific): back   Did you confirm the insurance currently in Epic is correct (important for a referral):    Does the patient want the referral with a specific physician:  no  Is the [facility]:    Comments:      ##Advise the patient that once the physician approves this either a nurse or the  will return their call##

## 2019-02-14 ENCOUNTER — TELEPHONE (OUTPATIENT)
Dept: NEUROSURGERY | Facility: CLINIC | Age: 44
End: 2019-02-14

## 2019-02-14 RX ORDER — METHOCARBAMOL 750 MG/1
TABLET, FILM COATED ORAL
Qty: 30 TABLET | Refills: 0 | OUTPATIENT
Start: 2019-02-14

## 2019-02-14 NOTE — TELEPHONE ENCOUNTER
----- Message from Josi Rivers sent at 2/14/2019  8:21 AM CST -----  Spoke with patient and informed him pain mgmt does not normally accept medicaid through [facility]. I offered to give him the number to the [facility] medicaid dept but he declined. He would like to know if you could order physical therapy instead.    Thanks!    Josi

## 2019-02-14 NOTE — TELEPHONE ENCOUNTER
Left VM for pt explaining that as soon as the template comes available for May with available Medicaid slots, I will be happy to schedule him.   ----- Message from Savanna Mann sent at 2/14/2019  9:11 AM CST -----  Contact: Pt. [phone]  Needs Advice    Reason for call: The patient would like to speak to someone regarding his surgery.  Please contact the patient to discuss further.          Communication Preference:PHONE     Additional Information:

## 2019-02-18 DIAGNOSIS — M54.9 BACK PAIN, UNSPECIFIED BACK LOCATION, UNSPECIFIED BACK PAIN LATERALITY, UNSPECIFIED CHRONICITY: ICD-10-CM

## 2019-02-18 DIAGNOSIS — G89.29 OTHER CHRONIC PAIN: Primary | ICD-10-CM

## 2019-03-08 RX ORDER — CYCLOBENZAPRINE HCL 10 MG
TABLET ORAL
Qty: 20 TABLET | Refills: 0 | Status: SHIPPED | OUTPATIENT
Start: 2019-03-08

## 2019-04-01 DIAGNOSIS — M47.14 THORACIC MYELOPATHY: Primary | ICD-10-CM

## 2019-07-01 DIAGNOSIS — M47.14 THORACIC MYELOPATHY: Primary | ICD-10-CM

## 2019-07-04 NOTE — TELEPHONE ENCOUNTER
----- Message from Bhavna Olmstead sent at 12/3/2018 12:26 PM CST -----  Contact: Spouse  Patient Requesting Sooner Appointment.     Reason for sooner appt.: pt has to be seen before he has surgery    When is the first available appointment? 01/23/19    Communication Preference: Call back [phone]    Additional Information:    
Spoke with pt to r/s appt.   
Silvia Lindo

## 2019-07-24 RX ORDER — CYCLOBENZAPRINE HCL 10 MG
TABLET ORAL
Qty: 20 TABLET | Refills: 0 | OUTPATIENT
Start: 2019-07-24

## 2019-08-23 DIAGNOSIS — E11.9 TYPE 2 DIABETES MELLITUS WITHOUT COMPLICATION: ICD-10-CM

## 2019-09-25 ENCOUNTER — TELEPHONE (OUTPATIENT)
Dept: NEPHROLOGY | Facility: CLINIC | Age: 44
End: 2019-09-25

## 2019-09-25 NOTE — TELEPHONE ENCOUNTER
----- Message from Tiera Elizabeth sent at 9/25/2019  3:05 PM CDT -----  Contact: Patient   Patient is needing to make an appointment for the 10/0 230pm    [phone]

## 2019-09-25 NOTE — TELEPHONE ENCOUNTER
----- Message from Tiera Elizabeth sent at 9/25/2019  3:05 PM CDT -----  Contact: Patient   Patient is needing to make an appointment for the 10/0 230pm    [phone]

## 2019-09-26 ENCOUNTER — TELEPHONE (OUTPATIENT)
Dept: NEPHROLOGY | Facility: CLINIC | Age: 44
End: 2019-09-26

## 2019-09-26 DIAGNOSIS — I10 ESSENTIAL HYPERTENSION: ICD-10-CM

## 2019-09-26 RX ORDER — CARVEDILOL 25 MG/1
TABLET ORAL
Qty: 270 TABLET | Refills: 1 | OUTPATIENT
Start: 2019-09-26

## 2019-09-26 NOTE — TELEPHONE ENCOUNTER
----- Message from Tawanda Lee MD sent at 9/25/2019  5:40 PM CDT -----  No unfortunately it's a full clinic that day and his mom has an appointment at 2:30 that day, since I haven't seen her in a while there will be a lot to catch up on her health and so I am not sure I will have much time to look into his chart because there are appointments before and after as well.     ----- Message -----  From: Kassy Henderson MA  Sent: 9/25/2019   3:51 PM CDT  To: Tawanda Lee MD    [facility] in regards to this pt . Pt declined first available appt date in November for a f/u appt. He wanted me to ask if you can squeeze him on October 8th at 2:30 ????       Thanks

## 2019-09-27 ENCOUNTER — PATIENT MESSAGE (OUTPATIENT)
Dept: PRIMARY CARE CLINIC | Facility: CLINIC | Age: 44
End: 2019-09-27

## 2019-09-27 DIAGNOSIS — I10 ESSENTIAL HYPERTENSION: ICD-10-CM

## 2019-09-27 RX ORDER — CARVEDILOL 25 MG/1
TABLET ORAL
Qty: 270 TABLET | Refills: 1 | OUTPATIENT
Start: 2019-09-27

## 2019-11-02 RX ORDER — METFORMIN HYDROCHLORIDE 1000 MG/1
TABLET ORAL
Qty: 60 TABLET | Refills: 5 | OUTPATIENT
Start: 2019-11-02

## 2019-11-05 RX ORDER — METFORMIN HYDROCHLORIDE 1000 MG/1
TABLET ORAL
Qty: 60 TABLET | Refills: 5 | OUTPATIENT
Start: 2019-11-05

## 2020-02-21 ENCOUNTER — HOSPITAL ENCOUNTER (EMERGENCY)
Facility: HOSPITAL | Age: 45
Discharge: HOME | End: 2020-02-21
Attending: EMERGENCY MEDICINE
Payer: MEDICAID

## 2020-02-21 VITALS
OXYGEN SATURATION: 99 % | HEIGHT: 69 IN | DIASTOLIC BLOOD PRESSURE: 92 MMHG | BODY MASS INDEX: 42.21 KG/M2 | TEMPERATURE: 98 F | WEIGHT: 285 LBS | SYSTOLIC BLOOD PRESSURE: 152 MMHG | HEART RATE: 82 BPM | RESPIRATION RATE: 18 BRPM

## 2020-02-21 DIAGNOSIS — R10.9 RIGHT FLANK PAIN: Primary | ICD-10-CM

## 2020-02-21 LAB
ALBUMIN SERPL BCP-MCNC: 4.5 G/DL (ref 3.5–5.2)
ALP SERPL-CCNC: 74 U/L (ref 38–126)
ALT SERPL W/O P-5'-P-CCNC: 18 U/L (ref 10–44)
ANION GAP SERPL CALC-SCNC: 10 MMOL/L (ref 8–16)
AST SERPL-CCNC: 19 U/L (ref 15–46)
BASOPHILS # BLD AUTO: 0.07 K/UL (ref 0–0.2)
BASOPHILS NFR BLD: 1.2 % (ref 0–1.9)
BILIRUB SERPL-MCNC: 0.4 MG/DL (ref 0.1–1)
BILIRUB UR QL STRIP: NEGATIVE
BUN SERPL-MCNC: 12 MG/DL (ref 2–20)
CALCIUM SERPL-MCNC: 9.2 MG/DL (ref 8.7–10.5)
CHLORIDE SERPL-SCNC: 99 MMOL/L (ref 95–110)
CLARITY UR REFRACT.AUTO: CLEAR
CO2 SERPL-SCNC: 29 MMOL/L (ref 23–29)
COLOR UR AUTO: YELLOW
CREAT SERPL-MCNC: 0.88 MG/DL (ref 0.5–1.4)
DIFFERENTIAL METHOD: ABNORMAL
EOSINOPHIL # BLD AUTO: 0.1 K/UL (ref 0–0.5)
EOSINOPHIL NFR BLD: 2.2 % (ref 0–8)
ERYTHROCYTE [DISTWIDTH] IN BLOOD BY AUTOMATED COUNT: 12.6 % (ref 11.5–14.5)
EST. GFR  (AFRICAN AMERICAN): >60 ML/MIN/1.73 M^2
EST. GFR  (NON AFRICAN AMERICAN): >60 ML/MIN/1.73 M^2
GLUCOSE SERPL-MCNC: 113 MG/DL (ref 70–110)
GLUCOSE UR QL STRIP: NEGATIVE
HCT VFR BLD AUTO: 36.9 % (ref 40–54)
HGB BLD-MCNC: 12.2 G/DL (ref 14–18)
HGB UR QL STRIP: ABNORMAL
IMM GRANULOCYTES # BLD AUTO: 0.01 K/UL (ref 0–0.04)
IMM GRANULOCYTES NFR BLD AUTO: 0.2 % (ref 0–0.5)
KETONES UR QL STRIP: NEGATIVE
LEUKOCYTE ESTERASE UR QL STRIP: NEGATIVE
LYMPHOCYTES # BLD AUTO: 1.4 K/UL (ref 1–4.8)
LYMPHOCYTES NFR BLD: 23.4 % (ref 18–48)
MCH RBC QN AUTO: 27.1 PG (ref 27–31)
MCHC RBC AUTO-ENTMCNC: 33.1 G/DL (ref 32–36)
MCV RBC AUTO: 82 FL (ref 82–98)
MICROSCOPIC COMMENT: NORMAL
MONOCYTES # BLD AUTO: 0.3 K/UL (ref 0.3–1)
MONOCYTES NFR BLD: 5.3 % (ref 4–15)
NEUTROPHILS # BLD AUTO: 4 K/UL (ref 1.8–7.7)
NEUTROPHILS NFR BLD: 67.7 % (ref 38–73)
NITRITE UR QL STRIP: NEGATIVE
NRBC BLD-RTO: 0 /100 WBC
PH UR STRIP: 5 [PH] (ref 5–8)
PLATELET # BLD AUTO: 274 K/UL (ref 150–350)
PMV BLD AUTO: 9.8 FL (ref 9.2–12.9)
POTASSIUM SERPL-SCNC: 3.8 MMOL/L (ref 3.5–5.1)
PROT SERPL-MCNC: 7.7 G/DL (ref 6–8.4)
PROT UR QL STRIP: ABNORMAL
RBC # BLD AUTO: 4.5 M/UL (ref 4.6–6.2)
RBC #/AREA URNS AUTO: 2 /HPF (ref 0–4)
SODIUM SERPL-SCNC: 138 MMOL/L (ref 136–145)
SP GR UR STRIP: 1.01 (ref 1–1.03)
URN SPEC COLLECT METH UR: ABNORMAL
UROBILINOGEN UR STRIP-ACNC: NEGATIVE EU/DL
WBC # BLD AUTO: 5.86 K/UL (ref 3.9–12.7)
WBC #/AREA URNS AUTO: 1 /HPF (ref 0–5)

## 2020-02-21 PROCEDURE — 96361 HYDRATE IV INFUSION ADD-ON: CPT | Mod: ER

## 2020-02-21 PROCEDURE — 63600175 PHARM REV CODE 636 W HCPCS: Mod: ER | Performed by: EMERGENCY MEDICINE

## 2020-02-21 PROCEDURE — 81000 URINALYSIS NONAUTO W/SCOPE: CPT | Mod: ER

## 2020-02-21 PROCEDURE — 96374 THER/PROPH/DIAG INJ IV PUSH: CPT | Mod: ER

## 2020-02-21 PROCEDURE — 80053 COMPREHEN METABOLIC PANEL: CPT | Mod: ER

## 2020-02-21 PROCEDURE — 85025 COMPLETE CBC W/AUTO DIFF WBC: CPT | Mod: ER

## 2020-02-21 PROCEDURE — 99284 EMERGENCY DEPT VISIT MOD MDM: CPT | Mod: 25,ER

## 2020-02-21 RX ORDER — TAMSULOSIN HYDROCHLORIDE 0.4 MG/1
0.4 CAPSULE ORAL DAILY
Qty: 30 CAPSULE | Refills: 0 | Status: SHIPPED | OUTPATIENT
Start: 2020-02-21 | End: 2021-02-20

## 2020-02-21 RX ORDER — HYDROCHLOROTHIAZIDE 12.5 MG/1
12.5 TABLET ORAL DAILY
COMMUNITY
End: 2021-01-29

## 2020-02-21 RX ORDER — HYDROCODONE BITARTRATE AND ACETAMINOPHEN 5; 325 MG/1; MG/1
1 TABLET ORAL
Status: DISCONTINUED | OUTPATIENT
Start: 2020-02-21 | End: 2020-02-21 | Stop reason: HOSPADM

## 2020-02-21 RX ORDER — KETOROLAC TROMETHAMINE 30 MG/ML
15 INJECTION, SOLUTION INTRAMUSCULAR; INTRAVENOUS
Status: COMPLETED | OUTPATIENT
Start: 2020-02-21 | End: 2020-02-21

## 2020-02-21 RX ORDER — GABAPENTIN 600 MG/1
600 TABLET ORAL 3 TIMES DAILY
COMMUNITY

## 2020-02-21 RX ORDER — CLONIDINE HYDROCHLORIDE 0.2 MG/1
0.2 TABLET ORAL 2 TIMES DAILY
COMMUNITY
End: 2021-01-29

## 2020-02-21 RX ORDER — NAPROXEN 500 MG/1
500 TABLET ORAL 2 TIMES DAILY PRN
Qty: 20 TABLET | Refills: 0 | Status: SHIPPED | OUTPATIENT
Start: 2020-02-21

## 2020-02-21 RX ADMIN — KETOROLAC TROMETHAMINE 15 MG: 30 INJECTION, SOLUTION INTRAMUSCULAR at 04:02

## 2020-02-21 RX ADMIN — SODIUM CHLORIDE 1000 ML: 0.9 INJECTION, SOLUTION INTRAVENOUS at 04:02

## 2020-02-21 NOTE — ED PROVIDER NOTES
Chief Complaint   Patient presents with    Abdominal Pain     c/o lower abdominal fullness and frequency in urination started yesterday. denies any fever. hx of polycystic kidney disease and urosepsis. sent from urgent care. denies any pain with urination or blood in urine.                Edgar Patton 44 y.o.  presents to the emergency department today with complaint of left flank pain. It started yesterday in the left flank, today he feels it more in the lower left quadrant.  It is sharp and radiates downward towards the groin. Nothing makes the pain better. The pt has had some nausea and vomiting. No hematemesis. No diarrhea. No fever, chills or sweats. No change in urination.  No frequency, no urgency, no dysuria, no hematuria. Pt has known HTN and is on mulitple meds including just starting clonidine. Known difficulty to control HTN.       ROS    Constitutional: No fever, no chills.  Eyes: No discharge. No pain  HENT: No nasal drainage. No ear ache. No sore throat.   Cardiovascular: No chest pain, no palpitations.  Respiratory: No cough, no shortness of breath.  Gastrointestinal: See above.  Genitourinary: See above.  Musculoskeletal: No back pain.  Skin: No rashes, no lesions.  Neurological: No headache, no focal weakness.      ALLERGIES REVIEWED  MEDICATIONS REVIEWED  PMH/PSH/SOC/FH REVIEWED       Past Medical History:   Diagnosis Date    CKD (chronic kidney disease), stage II     Diabetes mellitus, type 2     Erectile dysfunction     Glaucoma     Hypertension     Malignant hypertension 3/16/2016    Noncompliance        History reviewed. No pertinent surgical history.    Social History     Socioeconomic History    Marital status: Single     Spouse name: Not on file    Number of children: 2    Years of education: Not on file    Highest education level: Not on file   Occupational History    Occupation: employed   Social Needs    Financial resource strain: Not on file    Food insecurity:      "Worry: Not on file     Inability: Not on file    Transportation needs:     Medical: Not on file     Non-medical: Not on file   Tobacco Use    Smoking status: Never Smoker    Smokeless tobacco: Never Used   Substance and Sexual Activity    Alcohol use: No     Alcohol/week: 0.0 standard drinks    Drug use: Never    Sexual activity: Not on file   Lifestyle    Physical activity:     Days per week: Not on file     Minutes per session: Not on file    Stress: Not on file   Relationships    Social connections:     Talks on phone: Not on file     Gets together: Not on file     Attends Denominational service: Not on file     Active member of club or organization: Not on file     Attends meetings of clubs or organizations: Not on file     Relationship status: Not on file   Other Topics Concern    Not on file   Social History Narrative    Not on file       BP (!) 218/113   Pulse 78   Temp 97.8 °F (36.6 °C) (Oral)   Resp 17   Ht 5' 9" (1.753 m)   Wt 129.3 kg (285 lb)   SpO2 100%   BMI 42.09 kg/m²       Physical Exam  Nursing/Ancillary staff note reviewed.  VS reviewed    General Appearance: The patient is a well-developed 37 year male who is alert, has no immediate need for airway protection and no signs of toxicity.  He is lying in bed but able to get up on his own without any difficulty.  HEENT: Eyes: Pupils equal and round no pallor or injection. Extra ocular movements intact.      Mouth: Mucous membranes are moist. Oropharynx clear.    Neck: Neck is supple non-tender. No lymphadenopathy.  Respiratory: There are no retractions, lungs are clear to auscultation.  Cardiovascular: Regular rate and rhythm. No murmurs, rubs or gallops.    Gastrointestinal:  Abdomen is soft and non-tender, no masses, bowel sounds normal. No CVA tenderness.  Neurological: Alert and oriented x 4. CN II-XII grossly intact. No focal weakness. Strength intact 5/5 bilaterally in upper and lower extremities.   Skin: Warm and dry, no rashes. "   Musculoskeletal: Extremities are non-tender, non-swollen and have full range of motion.     DIFFERENTIAL DIAGNOSIS: After history and physical exam a differential diagnosis was considered, but was not limited to,  musculoskeletal causes, kidney stone, pyelonephritis, shingles, and intra-abdominal causes such as diverticulitis and appendicitis.      Initial : Edgar sifuentes 44 y.o. male presents to the ED today with flank pain. Will obtain UA, labs and obtain CT scan to evaluate for kidney stones.  Treat pain and reassess.       Labs Reviewed   URINALYSIS, REFLEX TO URINE CULTURE - Abnormal; Notable for the following components:       Result Value    Protein, UA Trace (*)     Occult Blood UA 2+ (*)     All other components within normal limits    Narrative:     Preferred Collection Type->Urine, Clean Catch   CBC W/ AUTO DIFFERENTIAL - Abnormal; Notable for the following components:    RBC 4.50 (*)     Hemoglobin 12.2 (*)     Hematocrit 36.9 (*)     All other components within normal limits   COMPREHENSIVE METABOLIC PANEL - Abnormal; Notable for the following components:    Glucose 113 (*)     All other components within normal limits   URINALYSIS MICROSCOPIC    Narrative:     Preferred Collection Type->Urine, Clean Catch       CT Renal Stone Study ABD Pelvis WO   Final Result      1. There are nonobstructive stones in both kidneys. One of the larger ones measures 12 mm and is located in the inferior pole of the right kidney.   2. The kidneys are enlarged with multiple cysts scattered throughout both of the kidneys. One of the larger ones measures 69 mm and is located off of the inferior pole of the left kidney.  This is consistent with the patient's history and characteristic of autosomal dominant polycystic kidney disease.   3. There is an enlarged lymph node in the left groin. It has a short axis measurement of 12 mm.   4. There is a small fat filled umbilical hernia. The width of the mouth of this hernia  measures 19 mm.   5. There are 4 lumbar-type vertebral bodies. There is a transitional vertebral body between L4 and the sacrum.  There is posterior spinal fusion hardware between T9 and L2.   All CT scans at this facility use dose modulation, iterative reconstruction, and/or weight base dosing when appropriate to reduce radiation dose when appropriate to reduce radiation dose to as low as reasonably achievable.         Electronically signed by: Lg Marc MD   Date:    02/21/2020   Time:    17:08                ED Course       The patient's pain is resolved.  Awaiting CT scan.        MDM  This is a 44-year-old male who presents to the emergency department today with right flank pain.  His workup today shows that he does have some kidney stones with this time no ureteral stones.  No sign of obstruction.  No sign of infection.  Patient's pain is improved following medications.  I will discharge him home on anti-inflammatory and have him follow up.  Patient will need to follow up with Nephrology for his of chronic condition, they also asked for Urology information.  The patient is improved, tolerating p.o. and ready to go home. After taking into careful account the historical factors and physical exam findings of the patient's presentation today, in conjunction with the empirical and objective data obtained on ED workup, no acute emergent medical condition requiring admission has been identified. The patient appears to be low risk for an emergent medical condition and I feel it is safe and appropriate at this time for the patient to be discharged to follow-up as detailed in their discharge instructions for reevaluation and possible continued outpatient workup and management. I have discussed the specifics of the workup with the patient and the patient has verbalized understanding of the details of the workup, the diagnosis, the treatment plan, and the need for outpatient follow-up.  Although the patient has no  emergent etiology today this does not preclude the development of an emergent condition so in addition, I have advised the patient that they can return to the ED and/or activate EMS at any time with worsening of their symptoms, change of their symptoms, or with any other medical complaint.  The patient remained comfortable and stable during their visit in the ED.  Discharge and follow-up instructions discussed with the patient who expressed understanding and willingness to comply with my recommendations.     This medical record was prepared using voice dictation software. There may be phonetic errors.            The encounter diagnosis was Right flank pain.          New Prescriptions    NAPROXEN (NAPROSYN) 500 MG TABLET    Take 1 tablet (500 mg total) by mouth 2 (two) times daily as needed (pain).    TAMSULOSIN (FLOMAX) 0.4 MG CAP    Take 1 capsule (0.4 mg total) by mouth once daily.                Derick Moffett MD  02/21/20 [id number]

## 2020-02-21 NOTE — ED NOTES
Pt reports that he used some left over Flomax to help with his need to fully empty his bladder; however, after his first empty this morning he is still having issues with retention. Pt states that he feels like he needs to urinate but nothing will come out. Urine obtained and sent to lab for analysis.

## 2020-07-15 ENCOUNTER — TELEPHONE (OUTPATIENT)
Dept: NEUROSURGERY | Facility: CLINIC | Age: 45
End: 2020-07-15

## 2020-07-15 DIAGNOSIS — G95.9 CERVICAL MYELOPATHY: ICD-10-CM

## 2020-07-15 DIAGNOSIS — M48.04 THORACIC STENOSIS: ICD-10-CM

## 2020-07-15 DIAGNOSIS — M54.9 DORSALGIA, UNSPECIFIED: ICD-10-CM

## 2020-07-15 DIAGNOSIS — M47.16 LUMBAR SPONDYLOSIS WITH MYELOPATHY: Primary | ICD-10-CM

## 2020-07-15 DIAGNOSIS — M47.14 THORACIC SPONDYLOSIS WITH MYELOPATHY: ICD-10-CM

## 2020-07-15 NOTE — TELEPHONE ENCOUNTER
Pt states he decided to not pursue laser Sx bc he discovered it was not a legitimate procedure.  Reviewed pt w/Dr. Aponte and we will schedule the pt w/[facility] for a new work up since the pt has not been seen in ~2YR.  Pt claims he has a disc w/outside imaging containing a new MRI T and MRI L.  States he will drop it off at the clinic for upload.  V/U.  ----- Message from Chiara Moffett sent at 7/15/2020  1:37 PM CDT -----  Regarding: appointment  Contact: pt@ [phone]  Pt asdking to speak with someone in Dr. Aponte' office regarding him getting an appt, regarding the surgery that he had previously discussed with the doctor. Please call.

## 2020-07-21 ENCOUNTER — CLINICAL SUPPORT (OUTPATIENT)
Dept: REHABILITATION | Facility: HOSPITAL | Age: 45
End: 2020-07-21

## 2020-07-21 DIAGNOSIS — Z91.89 DRIVING SAFETY ISSUE: ICD-10-CM

## 2020-07-21 NOTE — PROGRESS NOTES
Occupational Therapy   Driving Evaluation    Edgar Patton   MRN: [id number]      Referring Physician: Grover Ruvalcaba MD  Diagnosis: Thoracic spondylosis with myelopathy   History: Pt is currently a licensed  but has been referred to Occupational Therapy by his MD for clinical and on-road testing to be used for driving evaluation. Mr Patton underwent surgery for thoracic myelopathy on 3/11/2019 and did not drive from 10/2018 to 3/2020. He has returned to driving on a limited basis.  He is here today to be formally tested for driving skills.     [facility] [id number]   Exp 8/28/2023  Restrictions: 01 corrective lens     SUBJECTIVE:   Pt reports that his is going to Physical Therapy to improve his R leg and ankle strength.     OBJECTIVE:   Physical Function Testing:    ROM:  WFLs B UEs , WFL's L LE, R LE is mildly limited for ankle dorsiflexion   Head / Neck ROM: WNL's   Pt is Right hand dominant   Strength: WFL B UEs, L LE is WFL, R LE is WFL at hip and knee but has mild weakness at ankle  Balance:    Static and Dynamic sitting- Normal   Static Standing - Normal    Dynamic standing - Good   Transfers and Mobility: Modified Independent for slightly increased time and uses 1 [place] crutch for assist   Rapid Pace Walk: 8.5 seconds which is near the average standard of 8 seconds    Perceptual testing:    Letter cancellation:  33/34, a passing score where testing was completed in 1 minute 3 seconds, average time for completion is 1 minute 15 seconds   Line bisection:  [facility]'s  Maze Test - 27 seconds, No error  ( Cut point score is 60 seconds or less with 1 error or less)   Trail Making Test A - 29  seconds   (Average 29 seconds, Deficient > 78 seconds)    Trail Making Test B -  74  seconds  (Average 75 seconds, Deficient > 273 seconds)   Motor Free Visual Perception Test (MVPT) which assesses figure ground, visual discrimination, spatial relationships, visual closure and visual memory,  36/36 , a perfect  passing score, where testing was competed in 4 minutes 44 seconds, average time for completion is 7 minutes   Right/Left discrimination: 4/4     Auditory discrimination: WFL     Vision testing:     Glasses were worn during testing  Quick acuity and night vision: pass  Color vision: 4/4,   Pt correctly identified 11/12 road signs and was 3/4 for depth perception  Both eye acuity: 20/20  Right eye acuity: 20/20  Left eye acuity: 20/20  Phoria which assesses binocular vision was normal.  Horizontal field test and nasal vision: Normal     Visual acuity minimum standards for the [facility] is 20/40 in one eye.    Cognitive testing:   Short term memory:  ,    Immediate # recall (Digit span) : 7,  passing score    Cognitive sequencing of months of year in reverse:  No error, no delay    [facility] Mental Status Exam (UMS) which is used to  detect mild neurocognitive disorder and dementia: Pt scored 29/30 which  scores in the Normal range.     Long term memory:  WNL     Judgment questions regarding rules of the road:     Insight:  Good as Pt wants to be safe to drive.    Communication:   Expressive aphasia:  Not found  Receptive aphasia:  Not found    Reaction time testin/100s or a second (avg of 3 trials) which is slightly slower than the average standard of 50/100s of a second. He also had mild difficulty with postioning his R foot on the pedals but improved with continued trials.       In-car / on-road assessment:  Mr Patton transferred to car Modified Independently needing mildly increased time and after orienting to the test vehicle, was able to adjust mirrors and seat, ruslan seat belt and start vehicle. He safely pulled car out of parking lot and drove on hospital grounds appropriately.  He then successfully drove on side  streets per instruction stopping correctly at stop signs, negotiating turns correctly and encountering light neighborhood traffic. He then was then directed to a  divided highway where he correctly obeyed traffic signs and signals, speed limits, followed at appropriate distances, changed lanes appropriately when asked and had no  difficulty stopping vehicle at appropriate distances. Mr Patton drove at normal speeds keeping up with traffic flow and was comfortable in  traffic. He was then directed [place] and after correctly merging into higher speed traffic, he was able to drive at higher speeds, was given instructions and was able to change lanes safely and correctly. Mr Patton was then given instructions to get off at a specific exit and was able to exit correctly. Once off of the [place], he was asked to find his way back to hospital grounds without given cues for direction. Mr Patton used his own judgement to find a route through traffic situations to return to hospital grounds. He returned the vehicle the parking lot and was able to parallel park with no difficulty.  Both traffic and rain situations were encountered on the road test today and Mr Patton had no difficulties.  The mild difficulty that he had in clinical testing with positioning of his R foot during reaction time testing was not apparent during On-road testing as he demonstrated good control of both the brake and accelerator.     ASSESSMENT:   Mr Patton demonstrated the ability to operate an automobile this day and demonstrated good insight as he realized that a driving assessment is necessary to show that he is capable of driving. He did very well in both clinica and on-road testing that was performed in multiple areas in various traffic and raining conditions. Mr Patton  has successfully passed this driving evaluation this day to drive his automobile with no limitations.  Findings were notified to the office of Grover Ruvalcaba MD. Changes in Pt medical status may warrant retesting in the future as the conclusion reached and the recommendations made reflect findings at the  time of this evaluation.    PLAN:   Discharge patient from outpatient Occupational Therapy services as Pt and MD needs being met with completion and reporting of this evaluation.      RONAL El, S  Occupational Therapist, Certified  Rehabilitation Specialist  7/21/2020

## 2020-10-31 ENCOUNTER — HOSPITAL ENCOUNTER (EMERGENCY)
Facility: HOSPITAL | Age: 45
Discharge: HOME | End: 2020-10-31
Attending: FAMILY MEDICINE
Payer: MEDICAID

## 2020-10-31 VITALS
HEIGHT: 69 IN | SYSTOLIC BLOOD PRESSURE: 161 MMHG | RESPIRATION RATE: 19 BRPM | WEIGHT: 276 LBS | TEMPERATURE: 98 F | HEART RATE: 59 BPM | BODY MASS INDEX: 40.88 KG/M2 | DIASTOLIC BLOOD PRESSURE: 97 MMHG | OXYGEN SATURATION: 99 %

## 2020-10-31 DIAGNOSIS — R10.9 ABDOMINAL PAIN: ICD-10-CM

## 2020-10-31 DIAGNOSIS — M48.05 SPINAL STENOSIS OF THORACOLUMBAR REGION: Primary | ICD-10-CM

## 2020-10-31 DIAGNOSIS — R10.13 EPIGASTRIC PAIN: ICD-10-CM

## 2020-10-31 LAB
ALBUMIN SERPL BCP-MCNC: 3.8 G/DL (ref 3.5–5.2)
ALP SERPL-CCNC: 67 U/L (ref 38–126)
ALT SERPL W/O P-5'-P-CCNC: 19 U/L (ref 10–44)
AMYLASE SERPL-CCNC: 52 U/L (ref 30–110)
ANION GAP SERPL CALC-SCNC: 10 MMOL/L (ref 8–16)
AST SERPL-CCNC: 17 U/L (ref 15–46)
BACTERIA #/AREA URNS AUTO: ABNORMAL /HPF
BASOPHILS # BLD AUTO: 0.05 K/UL (ref 0–0.2)
BASOPHILS NFR BLD: 1.1 % (ref 0–1.9)
BILIRUB SERPL-MCNC: 0.5 MG/DL (ref 0.1–1)
BILIRUB UR QL STRIP: NEGATIVE
BUN SERPL-MCNC: 17 MG/DL (ref 2–20)
CALCIUM SERPL-MCNC: 8.7 MG/DL (ref 8.7–10.5)
CHLORIDE SERPL-SCNC: 103 MMOL/L (ref 95–110)
CLARITY UR REFRACT.AUTO: CLEAR
CO2 SERPL-SCNC: 24 MMOL/L (ref 23–29)
COLOR UR AUTO: YELLOW
CREAT SERPL-MCNC: 0.99 MG/DL (ref 0.5–1.4)
DIFFERENTIAL METHOD: ABNORMAL
EOSINOPHIL # BLD AUTO: 0.1 K/UL (ref 0–0.5)
EOSINOPHIL NFR BLD: 1.7 % (ref 0–8)
ERYTHROCYTE [DISTWIDTH] IN BLOOD BY AUTOMATED COUNT: 12.4 % (ref 11.5–14.5)
EST. GFR  (AFRICAN AMERICAN): >60 ML/MIN/1.73 M^2
EST. GFR  (NON AFRICAN AMERICAN): >60 ML/MIN/1.73 M^2
GLUCOSE SERPL-MCNC: 92 MG/DL (ref 70–110)
GLUCOSE UR QL STRIP: NEGATIVE
HCT VFR BLD AUTO: 31 % (ref 40–54)
HGB BLD-MCNC: 10.3 G/DL (ref 14–18)
HGB UR QL STRIP: ABNORMAL
HYALINE CASTS UR QL AUTO: 1 /LPF
IMM GRANULOCYTES # BLD AUTO: 0 K/UL (ref 0–0.04)
IMM GRANULOCYTES NFR BLD AUTO: 0 % (ref 0–0.5)
KETONES UR QL STRIP: NEGATIVE
LEUKOCYTE ESTERASE UR QL STRIP: NEGATIVE
LIPASE SERPL-CCNC: 38 U/L (ref 23–300)
LYMPHOCYTES # BLD AUTO: 1.2 K/UL (ref 1–4.8)
LYMPHOCYTES NFR BLD: 25.1 % (ref 18–48)
MCH RBC QN AUTO: 28.1 PG (ref 27–31)
MCHC RBC AUTO-ENTMCNC: 33.2 G/DL (ref 32–36)
MCV RBC AUTO: 85 FL (ref 82–98)
MICROSCOPIC COMMENT: ABNORMAL
MONOCYTES # BLD AUTO: 0.4 K/UL (ref 0.3–1)
MONOCYTES NFR BLD: 8 % (ref 4–15)
NEUTROPHILS # BLD AUTO: 3.1 K/UL (ref 1.8–7.7)
NEUTROPHILS NFR BLD: 64.1 % (ref 38–73)
NITRITE UR QL STRIP: NEGATIVE
NRBC BLD-RTO: 0 /100 WBC
NT-PROBNP: 253 PG/ML (ref 5–450)
PH UR STRIP: 5 [PH] (ref 5–8)
PLATELET # BLD AUTO: 218 K/UL (ref 150–350)
PMV BLD AUTO: 9.7 FL (ref 9.2–12.9)
POTASSIUM SERPL-SCNC: 3.6 MMOL/L (ref 3.5–5.1)
PROT SERPL-MCNC: 6.7 G/DL (ref 6–8.4)
PROT UR QL STRIP: ABNORMAL
RBC # BLD AUTO: 3.67 M/UL (ref 4.6–6.2)
RBC #/AREA URNS AUTO: 28 /HPF (ref 0–4)
SODIUM SERPL-SCNC: 137 MMOL/L (ref 136–145)
SP GR UR STRIP: 1.02 (ref 1–1.03)
TROPONIN I SERPL DL<=0.01 NG/ML-MCNC: <0.012 NG/ML (ref 0.01–0.03)
URN SPEC COLLECT METH UR: ABNORMAL
UROBILINOGEN UR STRIP-ACNC: NEGATIVE EU/DL
WBC # BLD AUTO: 4.75 K/UL (ref 3.9–12.7)
WBC #/AREA URNS AUTO: 2 /HPF (ref 0–5)

## 2020-10-31 PROCEDURE — 96374 THER/PROPH/DIAG INJ IV PUSH: CPT | Mod: ER

## 2020-10-31 PROCEDURE — 93010 EKG 12-LEAD: ICD-10-PCS | Mod: ,,, | Performed by: INTERNAL MEDICINE

## 2020-10-31 PROCEDURE — 82150 ASSAY OF AMYLASE: CPT | Mod: ER

## 2020-10-31 PROCEDURE — 93010 ELECTROCARDIOGRAM REPORT: CPT | Mod: ,,, | Performed by: INTERNAL MEDICINE

## 2020-10-31 PROCEDURE — 96375 TX/PRO/DX INJ NEW DRUG ADDON: CPT | Mod: ER

## 2020-10-31 PROCEDURE — 80053 COMPREHEN METABOLIC PANEL: CPT | Mod: ER

## 2020-10-31 PROCEDURE — 85025 COMPLETE CBC W/AUTO DIFF WBC: CPT | Mod: ER

## 2020-10-31 PROCEDURE — 63600175 PHARM REV CODE 636 W HCPCS: Mod: ER | Performed by: FAMILY MEDICINE

## 2020-10-31 PROCEDURE — 81000 URINALYSIS NONAUTO W/SCOPE: CPT | Mod: ER

## 2020-10-31 PROCEDURE — 99285 EMERGENCY DEPT VISIT HI MDM: CPT | Mod: 25,ER

## 2020-10-31 PROCEDURE — 25000003 PHARM REV CODE 250: Mod: ER | Performed by: FAMILY MEDICINE

## 2020-10-31 PROCEDURE — 83690 ASSAY OF LIPASE: CPT | Mod: ER

## 2020-10-31 PROCEDURE — C9113 INJ PANTOPRAZOLE SODIUM, VIA: HCPCS | Mod: ER | Performed by: FAMILY MEDICINE

## 2020-10-31 PROCEDURE — 93005 ELECTROCARDIOGRAM TRACING: CPT | Mod: ER

## 2020-10-31 PROCEDURE — 84484 ASSAY OF TROPONIN QUANT: CPT | Mod: ER

## 2020-10-31 PROCEDURE — 83880 ASSAY OF NATRIURETIC PEPTIDE: CPT | Mod: ER

## 2020-10-31 RX ORDER — PANTOPRAZOLE SODIUM 40 MG/10ML
40 INJECTION, POWDER, LYOPHILIZED, FOR SOLUTION INTRAVENOUS
Status: COMPLETED | OUTPATIENT
Start: 2020-10-31 | End: 2020-10-31

## 2020-10-31 RX ORDER — TIZANIDINE HYDROCHLORIDE 2 MG/1
CAPSULE, GELATIN COATED ORAL
COMMUNITY

## 2020-10-31 RX ORDER — ONDANSETRON 2 MG/ML
4 INJECTION INTRAMUSCULAR; INTRAVENOUS
Status: COMPLETED | OUTPATIENT
Start: 2020-10-31 | End: 2020-10-31

## 2020-10-31 RX ORDER — PANTOPRAZOLE SODIUM 40 MG/1
40 TABLET, DELAYED RELEASE ORAL DAILY
Qty: 30 TABLET | Refills: 0 | Status: SHIPPED | OUTPATIENT
Start: 2020-10-31

## 2020-10-31 RX ADMIN — PANTOPRAZOLE SODIUM 40 MG: 40 INJECTION, POWDER, FOR SOLUTION INTRAVENOUS at 02:10

## 2020-10-31 RX ADMIN — ONDANSETRON 4 MG: 2 INJECTION INTRAMUSCULAR; INTRAVENOUS at 02:10

## 2020-10-31 RX ADMIN — LIDOCAINE HYDROCHLORIDE: 20 SOLUTION ORAL; TOPICAL at 04:10

## 2020-10-31 NOTE — ED PROVIDER NOTES
Encounter Date: 10/31/2020       History     Chief Complaint   Patient presents with    Back Pain     Pt states has been treated for back pain but now has bloated sensation to abdomen and lower back.  Denies any difficulties with urination.  States last BM 2 days ago.  Pt states was started on hydralazine 1 week ago.       45-year-old male complains of low back pain with previous spinal surgery.  Continues to have numbness in his lower legs.  For last 2 days he has been having bloating in his stomach and unable to pass bowel movement.  Decreased appetite . no cough or fever.  No shortness of breath.  No chest pain.  Patient has been recently started on hydralazine for last 1 week.  He went to [facility] yesterday and was advised to be admitted for further evaluation and management.  Patient refused and has been discharged AMA.  Today he comes back to ER for further evaluation of similar symptoms not relieving.  No saddle anesthesia.  No bowel or bladder incontinence.  He has history of polycystic kidney disease, CKD stage 2, diabetes mellitus type 2, malignant hypertension.    The history is provided by the patient.     Review of patient's allergies indicates:   Allergen Reactions    Amlodipine      Leg swelling    Lisinopril Other (See Comments)     cough     Past Medical History:   Diagnosis Date    CKD (chronic kidney disease), stage II     Diabetes mellitus, type 2     Erectile dysfunction     Glaucoma     Hypertension     Malignant hypertension 3/16/2016    Noncompliance      History reviewed. No pertinent surgical history.  Family History   Problem Relation Age of Onset    Hypertension Mother     Diabetes Mother     Polycystic kidney disease Mother     Arthritis Mother     Hypertension Father     Diabetes Father     Polycystic kidney disease Sister     Glaucoma Brother     Amblyopia Neg Hx     Blindness Neg Hx     Cataracts Neg Hx     Macular degeneration Neg Hx     Retinal  detachment Neg Hx     Strabismus Neg Hx      Social History     Tobacco Use    Smoking status: Never Smoker    Smokeless tobacco: Never Used   Substance Use Topics    Alcohol use: No     Alcohol/week: 0.0 standard drinks    Drug use: Never     Review of Systems   Constitutional: Negative for activity change, appetite change, chills and fever.   HENT: Negative for congestion, ear discharge, rhinorrhea, sinus pressure, sinus pain, sore throat and trouble swallowing.    Eyes: Negative for photophobia, pain, discharge, redness, itching and visual disturbance.   Respiratory: Negative for cough, chest tightness, shortness of breath and wheezing.    Cardiovascular: Negative for chest pain, palpitations and leg swelling.   Gastrointestinal: Positive for constipation. Negative for abdominal distention, abdominal pain, diarrhea, nausea and vomiting.   Genitourinary: Negative for dysuria, flank pain, frequency and hematuria.   Musculoskeletal: Positive for back pain. Negative for gait problem, neck pain and neck stiffness.   Skin: Negative for rash and wound.   Neurological: Negative for dizziness, tremors, seizures, syncope, speech difficulty, weakness, light-headedness, numbness and headaches.   Psychiatric/Behavioral: Negative for behavioral problems, confusion, hallucinations and sleep disturbance. The patient is not nervous/anxious.    All other systems reviewed and are negative.      Physical Exam     Initial Vitals [10/31/20 1337]   BP Pulse Resp Temp SpO2   (!) 189/94 66 18 98.2 °F (36.8 °C) 99 %      MAP       --         Physical Exam    Nursing note and vitals reviewed.  Constitutional: Vital signs are normal. He appears well-developed and well-nourished. He is not diaphoretic. He is active. No distress.   HENT:   Head: Normocephalic and atraumatic.   Right Ear: Tympanic membrane normal.   Left Ear: Tympanic membrane normal.   Nose: Nose normal.   Mouth/Throat: Oropharynx is clear and moist.   Eyes:  Conjunctivae, EOM and lids are normal. Pupils are equal, round, and reactive to light.   Neck: Trachea normal, normal range of motion and full passive range of motion without pain. Neck supple. Normal range of motion present. No neck rigidity.   Cardiovascular: Normal rate, regular rhythm, S1 normal, S2 normal, normal heart sounds, intact distal pulses and normal pulses.   Pulmonary/Chest: Breath sounds normal. No respiratory distress. He has no wheezes. He has no rhonchi. He has no rales. He exhibits no tenderness.   Abdominal: Soft. Normal appearance and bowel sounds are normal. He exhibits no distension. There is abdominal tenderness in the epigastric area. There is no guarding.   Musculoskeletal: Normal range of motion.      Comments: Patient uses cane to walk.   Lymphadenopathy:     He has no cervical adenopathy.   Neurological: He is alert and oriented to person, place, and time. He has normal strength. No cranial nerve deficit or sensory deficit. Coordination and gait abnormal. GCS score is 15. GCS eye subscore is 4. GCS verbal subscore is 5. GCS motor subscore is 6.   No saddle anesthesia.  .  No new neurological deficits in lower limbs.   Skin: Skin is warm and intact. Capillary refill takes less than 2 seconds. No abrasion, no bruising and no rash noted.   Psychiatric: He has a normal mood and affect. His speech is normal and behavior is normal. Judgment and thought content normal. He is not actively hallucinating. Cognition and memory are normal. He is attentive.         ED Course   Procedures  Labs Reviewed   CBC W/ AUTO DIFFERENTIAL - Abnormal; Notable for the following components:       Result Value    RBC 3.67 (*)     Hemoglobin 10.3 (*)     Hematocrit 31.0 (*)     All other components within normal limits   URINALYSIS, REFLEX TO URINE CULTURE - Abnormal; Notable for the following components:    Protein, UA 1+ (*)     Occult Blood UA 3+ (*)     All other components within normal limits    Narrative:      Specimen Source->Urine   URINALYSIS MICROSCOPIC - Abnormal; Notable for the following components:    RBC, UA 28 (*)     All other components within normal limits    Narrative:     Specimen Source->Urine   AMYLASE   LIPASE   COMPREHENSIVE METABOLIC PANEL   NT-PRO NATRIURETIC PEPTIDE   TROPONIN I     EKG Readings: (Independently Interpreted)   Initial Reading: No STEMI. Rhythm: Sinus Bradycardia. Heart Rate: 55. Ectopy: No Ectopy. Conduction: Normal. ST Segments: Normal ST Segments. T Waves: Normal. T Waves Flipped: III, AVF, V5 and V6. Clinical Impression: Normal Sinus Rhythm   T-wave inversions noted in his previous EKG from [facility] slightly more pronounced today on comparison.       Imaging Results          CT Lumbar Spine Without Contrast (Final result)  Result time 10/31/20 15:40:48    Final result by Adan Gaitan Jr., MD (10/31/20 15:40:48)                 Impression:      1. No acute fracture.  2. Prior multilevel laminectomy and fusion of the thoracolumbar junction.  No definite residual spinal stenosis at the levels of prior laminectomy.  No sign of hardware loosening or failure.  3. Severe spinal stenosis at L2-L3 and L3-L4 where the thecal sac is approximately 10% of normal area.  Severe lateral recess stenosis at these levels could affect the descending L3 and or L4 spinal nerve roots.  4. Moderate to severe spinal stenosis at L4-L5 where there is also severe lateral recess stenosis.  5. Severe foraminal stenosis bilaterally at L4-L5 could affect L4 spinal nerves.  All CT scans at this facility use dose modulation, iterative reconstruction, and/or weight base dosing when appropriate to reduce radiation dose to as low as reasonably achievable.      Electronically signed by: Adan Gaitan Jr., MD  Date:    10/31/2020  Time:    15:40             Narrative:    EXAMINATION:  CT LUMBAR SPINE WITHOUT CONTRAST    CLINICAL HISTORY:  L/S-spine canal stenosis;    TECHNIQUE:  Contiguous axial images were obtained  of the lumbar spine without intravenous contrast. Coronal and sagittal reformations were acquired.    COMPARISON:  None.    FINDINGS:  There are postoperative changes from multilevel laminectomy and fusion at the thoracolumbar junction.  There are vertical rods and pedicle screws from T11 inferiorly at L2.  No sign of hardware loosening or fracture.  Straightening of the lumbar lordosis.  Vertebral body height is normal.  No acute fractures. Multiple cysts within the bilateral kidneys.  13 mm stone within the inferior right renal pelvis.  Intervertebral disc levels are as follows:    T12-L1: Level of prior laminectomy and posterior fusion.  Posterior disc osteophyte complex.  The dural canal measures roughly 15 mm.  No significant foraminal stenosis.    L1-L2: Prior laminectomy at this level with posterior fusion.  Posterior disc osteophyte complex.  The dural canal measures about 15 mm.  Mild left foraminal stenosis.    L2-L3: Normal disc space height with anterior and posterior osteophytes.  Severe degenerative facet hypertrophy with ossification of the ligamentum flavum.  The ligamentum flavum is hypertrophied.  The dural canal is flattened to 4 mm, about 10% of normal area.  Severe lateral recess stenosis bilaterally.  Mild bilateral neural foraminal stenosis.    L3-L4: Normal disc space height.  Anterior and posterior osteophytes.  Severe degenerative facet hypertrophy bilaterally.  The dural canal is flattened 2-3 mm, about 10% of normal area.  Severe lateral recess stenosis bilaterally.  Mild bilateral foraminal stenosis.    L4-L5: Posterior disc bulge.  Severe degenerative facet hypertrophy bilaterally.  The dural canal measures 5 mm AP and is roughly 30% of normal area.  Severe bilateral foraminal stenosis relating to facet arthropathy.    L5-S1: Normal disc space height.  Moderate degenerative facet hypertrophy bilaterally.  The dural canal measures 12 mm AP.  No significant foraminal stenosis.                                CT Thoracic Spine Without Contrast (Final result)  Result time 10/31/20 15:33:13    Final result by Lenore Zamarripa MD (Timothy) (10/31/20 15:33:13)                 Impression:      Previous fusions with laminectomies at C2-3 and at the thoracolumbar junction beginning at T9.  Multiple posterior osteophytes are noted throughout the thoracic spine causing central canal stenosis on the non operated levels.    All CT scans at this facility are performed  using dose modulation techniques as appropriate to performed exam including the following:  automated exposure control; adjustment of mA and/or kV according to the patients size (this includes techniques or standardized protocols for targeted exams where dose is matched to indication/reason for exam: i.e. extremities or head);  iterative reconstruction technique.      Electronically signed by: Lenore Zamarripa MD  Date:    10/31/2020  Time:    15:33             Narrative:    EXAMINATION:  CT THORACIC SPINE WITHOUT CONTRAST    CLINICAL HISTORY:  Spinal stenosis    TECHNIQUE:  Axial CT of the thoracic spine with coronal and sagittal reformates.    COMPARISON:  None    FINDINGS:  Normal alignment of the thoracic spine.  There is been a previous fusion of the thoracic spine there is a C2-3 level.  Prior laminectomies are present.  There is also been a previous fusion at the thoracolumbar junction, beginning at the T9 level.  There have been bilateral laminectomies.  There are multiple posterior osteophytes noted throughout the thoracic spine causing central canal stenosis at the non operated levels.  No definite disc herniations.  No fractures.                                 Medical Decision Making:   Initial Assessment:   Epigastric fullness for last 3 days.  Seen at [facility] with negative cardiac workup.  Refused for inpatient stay.  History of severe spinal stenosis of lumbar and had fusion 2 years ago.  Patient started on hydralazine recently about a  week ago.  Differential Diagnosis:   Angina, gastritis, GERD,  Thoracic spinal stenosis.  Medication side effects  Clinical Tests:   Lab Tests: Ordered and Reviewed  Radiological Study: Ordered and Reviewed  Medical Tests: Ordered and Reviewed  ED Management:  CT of thoracolumbar spine with spinal stenosis prominent in lumbar spine.  No acute stenosis in thoracic level which is contributing to abdominal pain.  EKG shows inferior and lateral T-wave inversions which are present from previous EKG from [facility].  Patient cardiac enzymes repeat today are negative.  Is given Protonix and GI cocktail which improved his symptoms.  Recommend stress test.  Patient refused for inpatient admission for stress test.  He would like to done as an outpatient.  Discussed in detail with his brother was a cardiologist- flus notified regarding CT results and also EKG results.  Patient would like to follow up PCP and get scheduled his stress test.  He has been prescribed Protonix as there is improvement in his symptoms.  He can discuss hydralazine with his PCP for possible discontinuation or to a different medication.  Patient will be giving call to his neurosurgeon regarding today's CT scan to discuss further.  Advised him to follow-up ED with immediately with worsening pain or any other concern.  Results discussed with patient.  Patient verbalizes understanding results,Diagnosis, treatment, management and prognosis.  Patient feels safe to go home.                             Clinical Impression:     ICD-10-CM ICD-9-CM   1. Spinal stenosis of thoracolumbar region  M48.05 724.01   2. Abdominal pain  R10.9 789.00   3. Epigastric pain  R10.13 789.06                      Disposition:   Disposition: Discharged  Condition: Stable     ED Disposition Condition    Discharge Stable        ED Prescriptions     Medication Sig Dispense Start Date End Date Auth. Provider    pantoprazole (PROTONIX) 40 MG tablet Take 1 tablet (40 mg total) by mouth once  daily. 30 tablet 10/31/2020  Sergio Díaz MD        Follow-up Information     Follow up With Specialties Details Why Contact Info    Caterina Scott MD Internal Medicine   [address]  [address]  [place] [address]  [phone]                                         Sergio Díaz MD  10/31/20 6050

## 2020-12-28 ENCOUNTER — HOSPITAL ENCOUNTER (EMERGENCY)
Facility: HOSPITAL | Age: 45
Discharge: HOME | End: 2020-12-28
Attending: EMERGENCY MEDICINE
Payer: MEDICAID

## 2020-12-28 VITALS
DIASTOLIC BLOOD PRESSURE: 109 MMHG | HEIGHT: 69 IN | WEIGHT: 275 LBS | OXYGEN SATURATION: 98 % | SYSTOLIC BLOOD PRESSURE: 220 MMHG | RESPIRATION RATE: 19 BRPM | HEART RATE: 71 BPM | TEMPERATURE: 98 F | BODY MASS INDEX: 40.73 KG/M2

## 2020-12-28 DIAGNOSIS — R07.9 CHEST PAIN: ICD-10-CM

## 2020-12-28 DIAGNOSIS — R07.9 CHEST PAIN, UNSPECIFIED TYPE: Primary | ICD-10-CM

## 2020-12-28 LAB
ALBUMIN SERPL BCP-MCNC: 4.1 G/DL (ref 3.5–5.2)
ALP SERPL-CCNC: 83 U/L (ref 38–126)
ALT SERPL W/O P-5'-P-CCNC: 21 U/L (ref 10–44)
ANION GAP SERPL CALC-SCNC: 9 MMOL/L (ref 8–16)
AST SERPL-CCNC: 18 U/L (ref 15–46)
BASOPHILS # BLD AUTO: 0.06 K/UL (ref 0–0.2)
BASOPHILS NFR BLD: 1 % (ref 0–1.9)
BILIRUB SERPL-MCNC: 0.9 MG/DL (ref 0.1–1)
CALCIUM SERPL-MCNC: 9 MG/DL (ref 8.7–10.5)
CHLORIDE SERPL-SCNC: 104 MMOL/L (ref 95–110)
CO2 SERPL-SCNC: 28 MMOL/L (ref 23–29)
CREAT SERPL-MCNC: 0.8 MG/DL (ref 0.5–1.4)
DIFFERENTIAL METHOD: ABNORMAL
EOSINOPHIL # BLD AUTO: 0.2 K/UL (ref 0–0.5)
EOSINOPHIL NFR BLD: 2.9 % (ref 0–8)
ERYTHROCYTE [DISTWIDTH] IN BLOOD BY AUTOMATED COUNT: 12.2 % (ref 11.5–14.5)
EST. GFR  (AFRICAN AMERICAN): >60 ML/MIN/1.73 M^2
EST. GFR  (NON AFRICAN AMERICAN): >60 ML/MIN/1.73 M^2
GLUCOSE SERPL-MCNC: 163 MG/DL (ref 70–110)
HCT VFR BLD AUTO: 34.7 % (ref 40–54)
HGB BLD-MCNC: 11.5 G/DL (ref 14–18)
IMM GRANULOCYTES # BLD AUTO: 0.02 K/UL (ref 0–0.04)
IMM GRANULOCYTES NFR BLD AUTO: 0.3 % (ref 0–0.5)
LYMPHOCYTES # BLD AUTO: 1.1 K/UL (ref 1–4.8)
LYMPHOCYTES NFR BLD: 18.8 % (ref 18–48)
MCH RBC QN AUTO: 28 PG (ref 27–31)
MCHC RBC AUTO-ENTMCNC: 33.1 G/DL (ref 32–36)
MCV RBC AUTO: 85 FL (ref 82–98)
MONOCYTES # BLD AUTO: 0.3 K/UL (ref 0.3–1)
MONOCYTES NFR BLD: 5.1 % (ref 4–15)
NEUTROPHILS # BLD AUTO: 4.2 K/UL (ref 1.8–7.7)
NEUTROPHILS NFR BLD: 71.9 % (ref 38–73)
NRBC BLD-RTO: 0 /100 WBC
NT-PROBNP SERPL-MCNC: 170 PG/ML (ref 5–450)
PLATELET # BLD AUTO: 232 K/UL (ref 150–350)
PMV BLD AUTO: 10.1 FL (ref 9.2–12.9)
POTASSIUM SERPL-SCNC: 3.6 MMOL/L (ref 3.5–5.1)
PROT SERPL-MCNC: 7.2 G/DL (ref 6–8.4)
RBC # BLD AUTO: 4.1 M/UL (ref 4.6–6.2)
SODIUM SERPL-SCNC: 141 MMOL/L (ref 136–145)
TROPONIN I SERPL-MCNC: <0.012 NG/ML (ref 0.01–0.03)
TROPONIN I SERPL-MCNC: <0.012 NG/ML (ref 0.01–0.03)
UUN UR-MCNC: 13 MG/DL (ref 2–20)
WBC # BLD AUTO: 5.89 K/UL (ref 3.9–12.7)

## 2020-12-28 PROCEDURE — 25000003 PHARM REV CODE 250: Mod: ER | Performed by: EMERGENCY MEDICINE

## 2020-12-28 PROCEDURE — 94760 N-INVAS EAR/PLS OXIMETRY 1: CPT | Mod: ER

## 2020-12-28 PROCEDURE — 93010 ELECTROCARDIOGRAM REPORT: CPT | Mod: ,,, | Performed by: INTERNAL MEDICINE

## 2020-12-28 PROCEDURE — 83880 ASSAY OF NATRIURETIC PEPTIDE: CPT | Mod: ER

## 2020-12-28 PROCEDURE — 93005 ELECTROCARDIOGRAM TRACING: CPT | Mod: ER

## 2020-12-28 PROCEDURE — 85025 COMPLETE CBC W/AUTO DIFF WBC: CPT | Mod: ER

## 2020-12-28 PROCEDURE — 84484 ASSAY OF TROPONIN QUANT: CPT | Mod: ER

## 2020-12-28 PROCEDURE — 25000242 PHARM REV CODE 250 ALT 637 W/ HCPCS: Mod: ER | Performed by: EMERGENCY MEDICINE

## 2020-12-28 PROCEDURE — 99285 EMERGENCY DEPT VISIT HI MDM: CPT | Mod: 25,ER

## 2020-12-28 PROCEDURE — 93010 EKG 12-LEAD: ICD-10-PCS | Mod: ,,, | Performed by: INTERNAL MEDICINE

## 2020-12-28 PROCEDURE — 80053 COMPREHEN METABOLIC PANEL: CPT | Mod: ER

## 2020-12-28 RX ORDER — MORPHINE SULFATE 4 MG/ML
4 INJECTION, SOLUTION INTRAMUSCULAR; INTRAVENOUS
Status: DISCONTINUED | OUTPATIENT
Start: 2020-12-28 | End: 2020-12-28 | Stop reason: HOSPADM

## 2020-12-28 RX ORDER — CARVEDILOL 12.5 MG/1
25 TABLET ORAL
Status: COMPLETED | OUTPATIENT
Start: 2020-12-28 | End: 2020-12-28

## 2020-12-28 RX ORDER — LOSARTAN POTASSIUM 50 MG/1
100 TABLET ORAL DAILY
COMMUNITY
End: 2021-01-29

## 2020-12-28 RX ORDER — CARVEDILOL 12.5 MG/1
25 TABLET ORAL
Status: DISCONTINUED | OUTPATIENT
Start: 2020-12-28 | End: 2020-12-28

## 2020-12-28 RX ORDER — CLONIDINE HYDROCHLORIDE 0.1 MG/1
0.3 TABLET ORAL
Status: COMPLETED | OUTPATIENT
Start: 2020-12-28 | End: 2020-12-28

## 2020-12-28 RX ORDER — TRIAMTERENE AND HYDROCHLOROTHIAZIDE 37.5; 25 MG/1; MG/1
1 CAPSULE ORAL EVERY MORNING
COMMUNITY
End: 2021-01-29

## 2020-12-28 RX ORDER — NITROGLYCERIN 0.4 MG/1
0.4 TABLET SUBLINGUAL EVERY 5 MIN PRN
Qty: 30 TABLET | Refills: 0 | Status: SHIPPED | OUTPATIENT
Start: 2020-12-28 | End: 2021-12-28

## 2020-12-28 RX ORDER — NITROGLYCERIN 0.4 MG/1
0.4 TABLET SUBLINGUAL EVERY 5 MIN PRN
Status: DISCONTINUED | OUTPATIENT
Start: 2020-12-28 | End: 2020-12-28 | Stop reason: HOSPADM

## 2020-12-28 RX ORDER — FUROSEMIDE 20 MG/1
20 TABLET ORAL 2 TIMES DAILY
COMMUNITY
End: 2022-06-16

## 2020-12-28 RX ORDER — ASPIRIN 325 MG
325 TABLET ORAL
Status: COMPLETED | OUTPATIENT
Start: 2020-12-28 | End: 2020-12-28

## 2020-12-28 RX ORDER — HYDROCHLOROTHIAZIDE 25 MG/1
25 TABLET ORAL
Status: DISCONTINUED | OUTPATIENT
Start: 2020-12-28 | End: 2020-12-28

## 2020-12-28 RX ADMIN — CARVEDILOL 25 MG: 12.5 TABLET, FILM COATED ORAL at 07:12

## 2020-12-28 RX ADMIN — NITROGLYCERIN 1 INCH: 20 OINTMENT TOPICAL at 06:12

## 2020-12-28 RX ADMIN — CLONIDINE HYDROCHLORIDE 0.3 MG: 0.1 TABLET ORAL at 07:12

## 2020-12-28 RX ADMIN — NITROGLYCERIN 0.4 MG: 0.4 TABLET SUBLINGUAL at 07:12

## 2020-12-28 RX ADMIN — ASPIRIN 325 MG ORAL TABLET 325 MG: 325 PILL ORAL at 06:12

## 2020-12-28 NOTE — ED NOTES
"Medication education provided on "Nitroglycerin ointment & Nitroglycerin SL."  Discussed dose, route, and monitoring of medications.  Discussed potential side-effects of medication.  Instruct to call with problems, needs, or concerns.  Pt verbalized understanding.   Will continue to monitor.    "

## 2020-12-28 NOTE — ED PROVIDER NOTES
"COVID Statement  The [employer] and Dago Mariee, Governor of the [facility], have declared a State of Public Health Emergency due to the spread of a novel coronavirus and disease (COVID-19).  There is no currently accepted treatment except conservative measures and respiratory support if appropriate.  This has lead to significant resource capacity and potential delays in care.         Encounter Date: 12/28/2020       History     Chief Complaint   Patient presents with    Chest Pain     Pt c/o "pressure" to center of chest with SOB X 1hr PTA. Pt reports radiation to bialteral shoulders. Denies N/V.  Pt is AAOX4, resp E/U, NADN.      Patient is a 45-year-old male with past medical history of CKD, HTN, DM who presents today c/o anterior chest heaviness that woke him from sleep at 430 AM.  Pain does not radiate.  It is improved with sitting up and worse with lying flat.  He has not had this pain in the past.  Denies associated SOB, N/V, diaphoresis  Denies family history of CAD  He is a non-smoker    The history is provided by the patient.     Review of patient's allergies indicates:   Allergen Reactions    Amlodipine      Leg swelling    Lisinopril Other (See Comments)     cough     Past Medical History:   Diagnosis Date    CKD (chronic kidney disease), stage II     Diabetes mellitus, type 2     Erectile dysfunction     Glaucoma     Hypertension     Malignant hypertension 3/16/2016    Noncompliance      History reviewed. No pertinent surgical history.  Family History   Problem Relation Age of Onset    Hypertension Mother     Diabetes Mother     Polycystic kidney disease Mother     Arthritis Mother     Hypertension Father     Diabetes Father     Polycystic kidney disease Sister     Glaucoma Brother     Amblyopia Neg Hx     Blindness Neg Hx     Cataracts Neg Hx     Macular degeneration Neg Hx     Retinal detachment Neg Hx     Strabismus Neg Hx      Social " History     Tobacco Use    Smoking status: Never Smoker    Smokeless tobacco: Never Used   Substance Use Topics    Alcohol use: No     Alcohol/week: 0.0 standard drinks    Drug use: Never     Review of Systems   Constitutional: Negative for chills and fever.   HENT: Negative for congestion and rhinorrhea.    Eyes: Negative for pain and visual disturbance.   Respiratory: Negative for cough and shortness of breath.    Cardiovascular: Positive for chest pain. Negative for leg swelling.   Gastrointestinal: Negative for abdominal pain, diarrhea, nausea and vomiting.   Genitourinary: Negative for dysuria and hematuria.   Musculoskeletal: Negative for back pain and neck pain.   Skin: Negative for rash.   Neurological: Negative for headaches.       Physical Exam     Initial Vitals [12/28/20 0550]   BP Pulse Resp Temp SpO2   (!) 257/103 71 19 98.1 °F (36.7 °C) 100 %      MAP       --         Physical Exam    Nursing note and vitals reviewed.  Constitutional: He appears well-developed and well-nourished. He is not diaphoretic. No distress.   HENT:   Head: Normocephalic and atraumatic.   Right Ear: External ear normal.   Left Ear: External ear normal.   Nose: Nose normal.   Eyes: EOM are normal.   Neck: Neck supple.   Cardiovascular: Normal rate, regular rhythm, normal heart sounds and intact distal pulses. Exam reveals no friction rub.    No murmur heard.  2+ radial pulses bilaterally   Pulmonary/Chest: Breath sounds normal. No respiratory distress. He has no wheezes. He has no rhonchi. He has no rales. He exhibits no tenderness.   Abdominal: Soft. There is no abdominal tenderness.   Obese abdomen   Musculoskeletal: Normal range of motion. No tenderness or edema.   Neurological: He is alert and oriented to person, place, and time. GCS score is 15. GCS eye subscore is 4. GCS verbal subscore is 5. GCS motor subscore is 6.   Skin: Skin is warm and dry. Capillary refill takes less than 2 seconds.   Psychiatric: He has a  normal mood and affect.         ED Course   Procedures  Labs Reviewed   CBC W/ AUTO DIFFERENTIAL - Abnormal; Notable for the following components:       Result Value    RBC 4.10 (*)     Hemoglobin 11.5 (*)     Hematocrit 34.7 (*)     All other components within normal limits   COMPREHENSIVE METABOLIC PANEL - Abnormal; Notable for the following components:    Glucose 163 (*)     All other components within normal limits   TROPONIN I   NT-PRO NATRIURETIC PEPTIDE   NT-PRO NATRIURETIC PEPTIDE   TROPONIN I        ECG Results          EKG 12-lead (Final result)  Result time 12/28/20 09:05:41    Final result by Interface, Lab In [facility] (12/28/20 09:05:41)                 Narrative:    Test Reason : R07.9,    Vent. Rate : 071 BPM     Atrial Rate : 071 BPM     P-R Int : 170 ms          QRS Dur : 110 ms      QT Int : 422 ms       P-R-T Axes : 008 -03 020 degrees     QTc Int : 458 ms    Sinus rhythm with Premature supraventricular complexes  Voltage criteria for left ventricular hypertrophy  Nonspecific ST abnormality  Abnormal ECG  When compared with ECG of 31-OCT-2020 15:53,  Premature supraventricular complexes are now Present  Nonspecific T wave abnormality has replaced inverted T waves in Inferior  leads  Confirmed by Da Capps MD ([id number]) on 12/28/2020 9:05:30 AM    Referred By: [email or web address]   SELF           Confirmed By:Da Capps MD                            Imaging Results          X-Ray Chest AP Portable (Final result)  Result time 12/28/20 07:19:43    Final result by Alec Driscoll MD (12/28/20 07:19:43)                 Impression:      No acute process seen.      Electronically signed by: Alec Driscoll MD  Date:    12/28/2020  Time:    07:19             Narrative:    EXAMINATION:  XR CHEST AP PORTABLE    CLINICAL HISTORY:  Chest Pain;    FINDINGS:  Single view of the chest.  Comparison 01/16/2019.  Low lung volumes limits evaluation.    Cardiac silhouette is normal.  The lungs demonstrate no evidence of  active disease.  No evidence of pleural effusion or pneumothorax.  Bones appear intact.  Postoperative changes are seen at the cervicothoracic and thoracic/lumbar junctions.                                 Medical Decision Making:   Initial Assessment:   Patient here with CP  Differential Diagnosis:   Myocardial ischemia, pericarditis, pulmonary embolus, chest wall pain, pleural inflammation, pulmonary infectious causes, aortic dissection.     Independently Interpreted Test(s):   I have ordered and independently interpreted EKG Reading(s) - see prior notes  Clinical Tests:   Lab Tests: Ordered and Reviewed  Radiological Study: Ordered and Reviewed  Medical Tests: Ordered and Reviewed  ED Management:    On re-evaluation, the patient's status has improved.  After complete ED evaluation, clinical impression is most consistent with chest pain.  No acute ischemia on EKG and troponins have been negative x2  However he had does have risk factors for coronary artery disease so recommended observation.  However patient declined.  He is aware of risks of leaving including death. HEART score of 3. He agrees to have outpatient stress test and to return to ED if he worsens in any way.  cardiology follow-up within 2-3 days was recommended.    After taking into careful account the patient's history, physical exam findings, as well as empirical and objective data obtained throughout ED workup, I feel no emergent medical condition has been identified. No further evaluation or admission was felt to be required, and the patient is stable for discharge from the ED. The patient and any additional family present were updated with test results, overall clinical impression, and recommended further plan of care, including discharge instructions as provided and outpatient follow-up for continued evaluation and management as needed. All questions were answered. The patient expressed understanding and agreed with current plan for discharge and  follow-up plan of care. Strict ED return precautions were provided, including return/worsening of current symptoms, new symptoms, or any other concerns.      Additional MDM:   Heart Score:    History:          Moderately suspicious.  ECG:             Normal  Age:               Less than 45 years  Risk factors: >= 3 risk factors or history of atherosclerotic disease  Troponin:       Less than or equal to normal limit  Final Score: 3                    ED Course as of Dec 28 1216   Mon Dec 28, 2020   0616 BP(!): 257/103 [LD]   0617 Temp: 98.1 °F (36.7 °C) [LD]   0617 Pulse: 71 [LD]   0617 Resp: 19 [LD]   0617 SpO2: 100 % [LD]   0648 EKG with sinus rhythm  Rate of 71 bpm.   + LVH no stemi    [LD]   0651 BP(!): 234/118 [LD]   0817 BP(!): 182/98 [LD]   0941 BP(!): 176/99 [LD]   1024 Troponin I: <0.012 [LD]   1030 Patient reports complete relief of his pain.  Strongly recommended that he stay in hospital for observation and stress testing, but patient declined.  Also his BP has been difficult to control in ED.  Patient requests that he have a minute to call his brother in [place] who is a cardiologist to ask his opinion.    [LD]   1035 Patient has spoke to family and still requests to leave.   States that he doesn't want to stay in hospital because of the corona virus.   Reports that he will get an outpatient stress test. Informed patient of risks of leaving including MI, CVA or death.  He understands and agrees to return if he develops any more symptoms.   Requesting rx for NTG SL to go home with.     [LD]   1040 Ambulatory referral for cardiology has been sent    [LD]   1210 Troponin I: <0.012 [LD]   1214 BP(!): 220/109 [LD]      ED Course User Index  [LD] Daisha Tamez MD            Clinical Impression:       ICD-10-CM ICD-9-CM   1. Chest pain, unspecified type  R07.9 786.50   2. Chest pain  R07.9 786.50                      Disposition:   Disposition: Discharged  Condition: Stable     ED Disposition  Condition    Discharge Stable        ED Prescriptions     Medication Sig Dispense Start Date End Date Auth. Provider    nitroGLYCERIN (NITROSTAT) 0.4 MG SL tablet Place 1 tablet (0.4 mg total) under the tongue every 5 (five) minutes as needed for Chest pain. 30 tablet 12/28/2020 12/28/2021 Daisha Tamez MD        Follow-up Information     Follow up With Specialties Details Why Contact Info    Kris Pedro MD INTERVENTIONAL CARDIOLOGY, Cardiology Call today to arrange outpatient follow up [facility]  [address]  [facility] [address]  [phone]                                         aDisha Tamez MD  12/28/20 1213

## 2020-12-28 NOTE — ED NOTES
Bed rails are up and call light is within patient reach.  Bed is low and locked.  [place] pt on rm, call bell, and POC.   Explained the MD would be with him as soon as possible,  Family sitting in rm.  Instruct to call for problems, needs, and concerns.  Pt verbalized understanding.  Will continue to monitor.

## 2020-12-28 NOTE — ED NOTES
Patient placed on continuous cardiac monitor, automatic blood pressure cuff and continuous pulse oximeter.  Rhythm is SR rate = 70.  Strong Murmur detected upon auscultation - rub.

## 2021-01-29 ENCOUNTER — OFFICE VISIT (OUTPATIENT)
Dept: CARDIOLOGY | Facility: CLINIC | Age: 46
End: 2021-01-29
Payer: MEDICAID

## 2021-01-29 VITALS
BODY MASS INDEX: 39.84 KG/M2 | DIASTOLIC BLOOD PRESSURE: 96 MMHG | OXYGEN SATURATION: 98 % | HEART RATE: 72 BPM | SYSTOLIC BLOOD PRESSURE: 158 MMHG | WEIGHT: 269 LBS | HEIGHT: 69 IN

## 2021-01-29 DIAGNOSIS — I10 ESSENTIAL HYPERTENSION: Chronic | ICD-10-CM

## 2021-01-29 DIAGNOSIS — E66.09 CLASS 2 OBESITY DUE TO EXCESS CALORIES WITHOUT SERIOUS COMORBIDITY WITH BODY MASS INDEX (BMI) OF 39.0 TO 39.9 IN ADULT: ICD-10-CM

## 2021-01-29 DIAGNOSIS — N18.2 TYPE 2 DIABETES MELLITUS WITH STAGE 2 CHRONIC KIDNEY DISEASE, WITHOUT LONG-TERM CURRENT USE OF INSULIN: ICD-10-CM

## 2021-01-29 DIAGNOSIS — R07.9 CHEST PAIN, UNSPECIFIED TYPE: ICD-10-CM

## 2021-01-29 DIAGNOSIS — I51.89 DIASTOLIC DYSFUNCTION WITHOUT HEART FAILURE: Chronic | ICD-10-CM

## 2021-01-29 DIAGNOSIS — Q61.2 POLYCYSTIC KIDNEY DISEASE, AUTOSOMAL DOMINANT: Chronic | ICD-10-CM

## 2021-01-29 DIAGNOSIS — I12.9 HYPERTENSIVE KIDNEY DISEASE WITH STAGE 2 CHRONIC KIDNEY DISEASE: Chronic | ICD-10-CM

## 2021-01-29 DIAGNOSIS — E11.22 TYPE 2 DIABETES MELLITUS WITH STAGE 2 CHRONIC KIDNEY DISEASE, WITHOUT LONG-TERM CURRENT USE OF INSULIN: ICD-10-CM

## 2021-01-29 DIAGNOSIS — N18.2 HYPERTENSIVE KIDNEY DISEASE WITH STAGE 2 CHRONIC KIDNEY DISEASE: Chronic | ICD-10-CM

## 2021-01-29 PROCEDURE — 99214 OFFICE O/P EST MOD 30 MIN: CPT | Mod: PBBFAC,PN | Performed by: INTERNAL MEDICINE

## 2021-01-29 PROCEDURE — 99204 OFFICE O/P NEW MOD 45 MIN: CPT | Mod: S$PBB,,, | Performed by: INTERNAL MEDICINE

## 2021-01-29 PROCEDURE — 99999 PR PBB SHADOW E&M-EST. PATIENT-LVL IV: ICD-10-PCS | Mod: PBBFAC,,, | Performed by: INTERNAL MEDICINE

## 2021-01-29 PROCEDURE — 99204 PR OFFICE/OUTPT VISIT, NEW, LEVL IV, 45-59 MIN: ICD-10-PCS | Mod: S$PBB,,, | Performed by: INTERNAL MEDICINE

## 2021-01-29 PROCEDURE — 99999 PR PBB SHADOW E&M-EST. PATIENT-LVL IV: CPT | Mod: PBBFAC,,, | Performed by: INTERNAL MEDICINE

## 2021-01-29 RX ORDER — CLONIDINE HYDROCHLORIDE 0.3 MG/1
0.3 TABLET ORAL 3 TIMES DAILY
COMMUNITY
Start: 2021-01-13 | End: 2021-08-09

## 2021-01-29 RX ORDER — SPIRONOLACTONE 25 MG/1
25 TABLET ORAL DAILY
Qty: 30 TABLET | Refills: 11 | Status: SHIPPED | OUTPATIENT
Start: 2021-01-29 | End: 2022-01-31

## 2021-01-29 RX ORDER — LOSARTAN POTASSIUM AND HYDROCHLOROTHIAZIDE 12.5; 1 MG/1; MG/1
TABLET ORAL DAILY
COMMUNITY
Start: 2021-01-13 | End: 2021-10-18 | Stop reason: SDUPTHER

## 2021-02-09 ENCOUNTER — HOSPITAL ENCOUNTER (OUTPATIENT)
Dept: RADIOLOGY | Facility: HOSPITAL | Age: 46
Discharge: HOME | End: 2021-02-09
Attending: INTERNAL MEDICINE
Payer: MEDICAID

## 2021-02-09 DIAGNOSIS — N18.2 TYPE 2 DIABETES MELLITUS WITH STAGE 2 CHRONIC KIDNEY DISEASE, WITHOUT LONG-TERM CURRENT USE OF INSULIN: ICD-10-CM

## 2021-02-09 DIAGNOSIS — I10 ESSENTIAL HYPERTENSION: ICD-10-CM

## 2021-02-09 DIAGNOSIS — E11.22 TYPE 2 DIABETES MELLITUS WITH STAGE 2 CHRONIC KIDNEY DISEASE, WITHOUT LONG-TERM CURRENT USE OF INSULIN: ICD-10-CM

## 2021-02-09 DIAGNOSIS — Q61.2 POLYCYSTIC KIDNEY DISEASE, AUTOSOMAL DOMINANT: ICD-10-CM

## 2021-02-09 PROCEDURE — 93975 VASCULAR STUDY: CPT | Mod: TC,PO

## 2021-08-06 ENCOUNTER — PATIENT MESSAGE (OUTPATIENT)
Dept: CARDIOLOGY | Facility: CLINIC | Age: 46
End: 2021-08-06

## 2021-08-09 ENCOUNTER — OFFICE VISIT (OUTPATIENT)
Dept: CARDIOLOGY | Facility: CLINIC | Age: 46
End: 2021-08-09
Payer: MEDICAID

## 2021-08-09 VITALS
BODY MASS INDEX: 41.35 KG/M2 | DIASTOLIC BLOOD PRESSURE: 76 MMHG | WEIGHT: 280 LBS | TEMPERATURE: 97 F | SYSTOLIC BLOOD PRESSURE: 125 MMHG | HEART RATE: 72 BPM

## 2021-08-09 DIAGNOSIS — I12.9 HYPERTENSIVE KIDNEY DISEASE WITH STAGE 2 CHRONIC KIDNEY DISEASE: Chronic | ICD-10-CM

## 2021-08-09 DIAGNOSIS — N18.2 TYPE 2 DIABETES MELLITUS WITH STAGE 2 CHRONIC KIDNEY DISEASE, WITHOUT LONG-TERM CURRENT USE OF INSULIN: ICD-10-CM

## 2021-08-09 DIAGNOSIS — E11.22 TYPE 2 DIABETES MELLITUS WITH STAGE 2 CHRONIC KIDNEY DISEASE, WITHOUT LONG-TERM CURRENT USE OF INSULIN: ICD-10-CM

## 2021-08-09 DIAGNOSIS — N18.2 HYPERTENSIVE KIDNEY DISEASE WITH STAGE 2 CHRONIC KIDNEY DISEASE: Chronic | ICD-10-CM

## 2021-08-09 DIAGNOSIS — I51.89 DIASTOLIC DYSFUNCTION WITHOUT HEART FAILURE: Chronic | ICD-10-CM

## 2021-08-09 DIAGNOSIS — Q61.2 POLYCYSTIC KIDNEY DISEASE, AUTOSOMAL DOMINANT: Chronic | ICD-10-CM

## 2021-08-09 DIAGNOSIS — E66.09 CLASS 2 OBESITY DUE TO EXCESS CALORIES WITHOUT SERIOUS COMORBIDITY WITH BODY MASS INDEX (BMI) OF 39.0 TO 39.9 IN ADULT: ICD-10-CM

## 2021-08-09 DIAGNOSIS — I10 ESSENTIAL HYPERTENSION: Primary | ICD-10-CM

## 2021-08-09 PROCEDURE — 99214 OFFICE O/P EST MOD 30 MIN: CPT | Mod: 95,,, | Performed by: INTERNAL MEDICINE

## 2021-08-09 PROCEDURE — 99214 PR OFFICE/OUTPT VISIT, EST, LEVL IV, 30-39 MIN: ICD-10-PCS | Mod: 95,,, | Performed by: INTERNAL MEDICINE

## 2021-08-23 ENCOUNTER — HOSPITAL ENCOUNTER (EMERGENCY)
Facility: HOSPITAL | Age: 46
Discharge: HOME | End: 2021-08-23
Attending: EMERGENCY MEDICINE
Payer: MEDICARE

## 2021-08-23 VITALS
TEMPERATURE: 98 F | HEART RATE: 71 BPM | SYSTOLIC BLOOD PRESSURE: 192 MMHG | DIASTOLIC BLOOD PRESSURE: 100 MMHG | OXYGEN SATURATION: 99 % | RESPIRATION RATE: 15 BRPM
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
## 2021-08-23 DIAGNOSIS — M25.461 EFFUSION, RIGHT KNEE: ICD-10-CM
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
## 2021-08-23 DIAGNOSIS — S80.02XA CONTUSION OF LEFT KNEE, INITIAL ENCOUNTER: Primary | ICD-10-CM

## 2021-08-23 DIAGNOSIS — S80.01XA CONTUSION OF RIGHT KNEE, INITIAL ENCOUNTER: ICD-10-CM

## 2021-08-23 PROCEDURE — 25000003 PHARM REV CODE 250: Mod: ER | Performed by: PHYSICIAN ASSISTANT

## 2021-08-23 PROCEDURE — 99284 EMERGENCY DEPT VISIT MOD MDM: CPT | Mod: 25,ER

## 2021-08-23 RX ORDER — KETOROLAC TROMETHAMINE 10 MG/1
10 TABLET, FILM COATED ORAL
Status: COMPLETED | OUTPATIENT
Start: 2021-08-23 | End: 2021-08-23

## 2021-08-23 RX ORDER — HYDROCODONE BITARTRATE AND ACETAMINOPHEN 5; 325 MG/1; MG/1
1 TABLET ORAL EVERY 6 HOURS PRN
Qty: 10 TABLET | Refills: 0 | Status: SHIPPED | OUTPATIENT
Start: 2021-08-23 | End: 2021-08-26

## 2021-08-23 RX ADMIN — KETOROLAC TROMETHAMINE 10 MG: 10 TABLET, FILM COATED ORAL at 11:08

## 2021-10-18 ENCOUNTER — PATIENT MESSAGE (OUTPATIENT)
Dept: ADMINISTRATIVE | Facility: OTHER | Age: 46
End: 2021-10-18
Payer: MEDICARE

## 2021-10-18 ENCOUNTER — PATIENT MESSAGE (OUTPATIENT)
Dept: CARDIOLOGY | Facility: CLINIC | Age: 46
End: 2021-10-18

## 2021-10-18 RX ORDER — LOSARTAN POTASSIUM AND HYDROCHLOROTHIAZIDE 12.5; 1 MG/1; MG/1
1 TABLET ORAL DAILY
Qty: 90 TABLET | Refills: 3 | Status: SHIPPED | OUTPATIENT
Start: 2021-10-18 | End: 2022-06-16

## 2022-02-10 PROBLEM — M76.31 IT BAND SYNDROME, RIGHT: Status: ACTIVE | Noted: 2022-02-10

## 2022-02-10 PROBLEM — M76.32 IT BAND SYNDROME, LEFT: Status: ACTIVE | Noted: 2022-02-10

## 2022-06-07 ENCOUNTER — PATIENT MESSAGE (OUTPATIENT)
Dept: CARDIOLOGY | Facility: CLINIC | Age: 47
End: 2022-06-07
Payer: MEDICARE

## 2022-06-16 ENCOUNTER — OFFICE VISIT (OUTPATIENT)
Dept: CARDIOLOGY | Facility: CLINIC | Age: 47
End: 2022-06-16
Payer: MEDICARE

## 2022-06-16 VITALS
OXYGEN SATURATION: 99 % | DIASTOLIC BLOOD PRESSURE: 90 MMHG | WEIGHT: 273.5 LBS | HEIGHT: 69 IN | BODY MASS INDEX: 40.51 KG/M2 | HEART RATE: 69 BPM | SYSTOLIC BLOOD PRESSURE: 156 MMHG

## 2022-06-16 DIAGNOSIS — N18.2 TYPE 2 DIABETES MELLITUS WITH STAGE 2 CHRONIC KIDNEY DISEASE, WITHOUT LONG-TERM CURRENT USE OF INSULIN: ICD-10-CM

## 2022-06-16 DIAGNOSIS — Q61.2 POLYCYSTIC KIDNEY DISEASE, AUTOSOMAL DOMINANT: Chronic | ICD-10-CM

## 2022-06-16 DIAGNOSIS — N18.2 CKD (CHRONIC KIDNEY DISEASE), STAGE II: Chronic | ICD-10-CM

## 2022-06-16 DIAGNOSIS — E66.01 CLASS 3 SEVERE OBESITY DUE TO EXCESS CALORIES WITHOUT SERIOUS COMORBIDITY WITH BODY MASS INDEX (BMI) OF 40.0 TO 44.9 IN ADULT: ICD-10-CM

## 2022-06-16 DIAGNOSIS — I12.9 HYPERTENSIVE KIDNEY DISEASE WITH STAGE 2 CHRONIC KIDNEY DISEASE: Chronic | ICD-10-CM

## 2022-06-16 DIAGNOSIS — I10 ESSENTIAL HYPERTENSION: Primary | ICD-10-CM

## 2022-06-16 DIAGNOSIS — N18.2 HYPERTENSIVE KIDNEY DISEASE WITH STAGE 2 CHRONIC KIDNEY DISEASE: Chronic | ICD-10-CM

## 2022-06-16 DIAGNOSIS — E11.22 TYPE 2 DIABETES MELLITUS WITH STAGE 2 CHRONIC KIDNEY DISEASE, WITHOUT LONG-TERM CURRENT USE OF INSULIN: ICD-10-CM

## 2022-06-16 PROCEDURE — 99214 OFFICE O/P EST MOD 30 MIN: CPT | Mod: S$PBB,,, | Performed by: INTERNAL MEDICINE

## 2022-06-16 PROCEDURE — 99214 PR OFFICE/OUTPT VISIT, EST, LEVL IV, 30-39 MIN: ICD-10-PCS | Mod: S$PBB,,, | Performed by: INTERNAL MEDICINE

## 2022-06-16 PROCEDURE — 99999 PR PBB SHADOW E&M-EST. PATIENT-LVL III: CPT | Mod: PBBFAC,,, | Performed by: INTERNAL MEDICINE

## 2022-06-16 PROCEDURE — 99999 PR PBB SHADOW E&M-EST. PATIENT-LVL III: ICD-10-PCS | Mod: PBBFAC,,, | Performed by: INTERNAL MEDICINE

## 2022-06-16 PROCEDURE — 99213 OFFICE O/P EST LOW 20 MIN: CPT | Mod: PBBFAC,PN | Performed by: INTERNAL MEDICINE

## 2022-06-16 RX ORDER — LOSARTAN POTASSIUM AND HYDROCHLOROTHIAZIDE 25; 100 MG/1; MG/1
1 TABLET ORAL DAILY
Qty: 90 TABLET | Refills: 3 | Status: SHIPPED | OUTPATIENT
Start: 2022-06-16 | End: 2023-06-16

## 2022-06-16 RX ORDER — SPIRONOLACTONE 25 MG/1
25 TABLET ORAL 2 TIMES DAILY
Qty: 60 TABLET | Refills: 11 | Status: SHIPPED | OUTPATIENT
Start: 2022-06-16

## 2022-06-16 NOTE — ASSESSMENT & PLAN NOTE
Controlled.  Goal BP < 130/80.  Compliant w/ meds.  - renal US negative for MERVAT   - d/c clonidine and increase HCTZ to 25 mg daily and increase spironolactone to 25 mg BID  - attempt to enroll in digital medicine program  - continue monitoring BP at home and record  - encouraged risk factor and lifestyle modifications (diet, exercise, and weight loss)

## 2022-06-16 NOTE — PROGRESS NOTES
Subjective:    Patient ID:  Edgar Patton is a 46 y.o. male who presents for follow-up of No chief complaint on file.      PCP: Alec Mckeon MD     HPI    Pt is a 47 yo M w/ PMH of DM2 w/ hgba1c 6.0%, PCKD w/ CKD2, and HTN who presents for f/u of HTN and chest pain.  He presented to the ED 12/28/2020 for cp which he mentions was related to hydralazine which resolved following d/c of the medication.  In the ED he had a negative cardiac w/u.  He was last seen 8/9/2021 and was continued on medical therapy and was attempted to enroll into the digital medicine prgm.  He monitors his BP at home and running 130-140s/70-90s.  He notes some episodes of bipedal edema.  He denies cp, sob, orthopnea, PND, presyncope, palpitations, LOC, and claudication.  He notes compliance w/ meds but has some drowsiness w/ the clonidine.  He has not been exercising as he plans to move out of the state soon.           Past Medical History:   Diagnosis Date    CKD (chronic kidney disease), stage II     Diabetes mellitus, type 2     Erectile dysfunction     Glaucoma     Hypertension     Malignant hypertension 3/16/2016    Noncompliance      Past Surgical History:   Procedure Laterality Date    BACK SURGERY       Social History     Socioeconomic History    Marital status:     Number of children: 2   Occupational History    Occupation: employed   Tobacco Use    Smoking status: Never Smoker    Smokeless tobacco: Never Used   Substance and Sexual Activity    Alcohol use: No     Alcohol/week: 0.0 standard drinks    Drug use: Never     Family History   Problem Relation Age of Onset    Hypertension Mother     Diabetes Mother     Polycystic kidney disease Mother     Arthritis Mother     Hypertension Father     Diabetes Father     Polycystic kidney disease Sister     Glaucoma Brother     Amblyopia Neg Hx     Blindness Neg Hx     Cataracts Neg Hx     Macular degeneration Neg Hx     Retinal detachment Neg Hx      Strabismus Neg Hx        Review of patient's allergies indicates:   Allergen Reactions    Amlodipine      Leg swelling    Hydralazine analogues     Lisinopril Other (See Comments)     cough       Medication List with Changes/Refills   New Medications    LOSARTAN-HYDROCHLOROTHIAZIDE 100-25 MG (HYZAAR) 100-25 MG PER TABLET    Take 1 tablet by mouth once daily.   Current Medications    ASPIRIN (ECOTRIN) 81 MG EC TABLET    Take 81 mg by mouth once daily.    CARVEDILOL (COREG) 25 MG TABLET    1.5 tabs po bid    CYCLOBENZAPRINE (FLEXERIL) 10 MG TABLET    TAKE 1 TABLET BY MOUTH NIGHTLY AS NEEDED FOR MUSCLE SPASM    ERGOCALCIFEROL (ERGOCALCIFEROL) 50,000 UNIT CAP    Take 1 capsule (50,000 Units total) by mouth every 7 days.    GABAPENTIN (NEURONTIN) 600 MG TABLET    Take 600 mg by mouth 3 (three) times daily.    LATANOPROST 0.005 % OPHTHALMIC SOLUTION    Place 1 drop into both eyes nightly.    METFORMIN (GLUCOPHAGE) 1000 MG TABLET    Take 1 tablet (1,000 mg total) by mouth 2 (two) times daily with meals.    NAPROXEN (NAPROSYN) 500 MG TABLET    Take 1 tablet (500 mg total) by mouth 2 (two) times daily as needed (pain).    NITROGLYCERIN (NITROSTAT) 0.4 MG SL TABLET    Place 1 tablet (0.4 mg total) under the tongue every 5 (five) minutes as needed for Chest pain.    PANTOPRAZOLE (PROTONIX) 40 MG TABLET    Take 1 tablet (40 mg total) by mouth once daily.    ROSUVASTATIN (CRESTOR) 10 MG TABLET    Take 1 tablet (10 mg total) by mouth once daily.    TAMSULOSIN (FLOMAX) 0.4 MG CAP    Take 1 capsule (0.4 mg total) by mouth once daily.    TIZANIDINE 2 MG CAP    Take by mouth.   Changed and/or Refilled Medications    Modified Medication Previous Medication    SPIRONOLACTONE (ALDACTONE) 25 MG TABLET spironolactone (ALDACTONE) 25 MG tablet       Take 1 tablet (25 mg total) by mouth 2 (two) times daily.    Take 1 tablet by mouth once daily   Discontinued Medications    FUROSEMIDE (LASIX) 20 MG TABLET    Take 20 mg by mouth 2 (two)  "times daily.    LOSARTAN-HYDROCHLOROTHIAZIDE 100-12.5 MG (HYZAAR) 100-12.5 MG TAB    Take 1 tablet by mouth once daily.       Review of Systems   Constitutional: Positive for malaise/fatigue. Negative for diaphoresis and fever.   HENT: Negative for congestion and hearing loss.    Eyes: Negative for blurred vision and pain.   Cardiovascular: Positive for leg swelling. Negative for chest pain, claudication, dyspnea on exertion, near-syncope, palpitations and syncope.   Respiratory: Positive for snoring. Negative for shortness of breath and sleep disturbances due to breathing.    Hematologic/Lymphatic: Negative for bleeding problem. Does not bruise/bleed easily.   Skin: Negative for color change and poor wound healing.   Musculoskeletal: Positive for back pain.   Gastrointestinal: Negative for abdominal pain and nausea.   Genitourinary: Negative for bladder incontinence and flank pain.   Neurological: Negative for focal weakness and light-headedness.        Objective:   BP (!) 156/90   Pulse 69   Ht 5' 9" (1.753 m)   Wt 124.1 kg (273 lb 8 oz)   SpO2 99%   BMI 40.39 kg/m²    Physical Exam  Constitutional:       Appearance: He is well-developed. He is obese. He is not diaphoretic.   HENT:      Head: Normocephalic and atraumatic.   Eyes:      General: No scleral icterus.     Pupils: Pupils are equal, round, and reactive to light.   Neck:      Vascular: No JVD.   Cardiovascular:      Rate and Rhythm: Normal rate and regular rhythm.      Pulses: Intact distal pulses.      Heart sounds: S1 normal and S2 normal. No murmur heard.    No friction rub. No gallop.   Pulmonary:      Effort: Pulmonary effort is normal. No respiratory distress.      Breath sounds: Normal breath sounds. No wheezing or rales.   Chest:      Chest wall: No tenderness.   Abdominal:      General: Bowel sounds are normal. There is no distension.      Palpations: Abdomen is soft. There is no mass.      Tenderness: There is no abdominal tenderness. There " is no rebound.   Musculoskeletal:         General: No tenderness. Normal range of motion.      Cervical back: Normal range of motion and neck supple.   Skin:     General: Skin is warm and dry.      Coloration: Skin is not pale.   Neurological:      Mental Status: He is alert and oriented to person, place, and time.      Coordination: Coordination normal.      Deep Tendon Reflexes: Reflexes normal.   Psychiatric:         Behavior: Behavior normal.         Judgment: Judgment normal.             EC2020- reviewed.  Sinus w/ premature supraventricular complexes, nonspecific ST changes, possible LVH.      Renal US: 2021- reviewed.    FINDINGS:  Right kidney: The right kidney measures 19.2 cm.  No evidence of cortical thinning.  No loss of corticomedullary distinction.  There is adequate perfusion.  The resistive indices range from 0.62-0.65.  No evidence of tardus parvus waveform. The main renal vein is patent.  No evidence of mass.  No evidence of hydronephrosis.Renal aortic ratio 1.0     No hydronephrosis.     Innumerable cysts compatible with polycystic kidney disease.  Right lower pole 5 mm nonobstructing renal stone.     Left kidney: The left kidney measures 22.3 cm.  No evidence of cortical thinning.  No loss of corticomedullary distinction.  There is adequate perfusion.  The resistive indices range from 0.65-0.66.  No evidence of pars parvus waveform.  The main renal vein is patent.  No evidence of mass.  No evidence of hydronephrosis.  Renal aortic ratio 0.9     No hydronephrosis.     Innumerable cysts compatible with polycystic kidney disease.  Left upper pole 7 mm nonobstructing renal stone.     Impression:     No evidence of renal artery stenosis.      Assessment:       1. Essential hypertension    2. Polycystic kidney disease, autosomal dominant    3. CKD (chronic kidney disease), stage II    4. Hypertensive kidney disease with stage 2 chronic kidney disease    5. Type 2 diabetes mellitus with  stage 2 chronic kidney disease, without long-term current use of insulin    6. Class 3 severe obesity due to excess calories without serious comorbidity with body mass index (BMI) of 40.0 to 44.9 in adult         Plan:         Essential hypertension  Controlled.  Goal BP < 130/80.  Compliant w/ meds.  - renal US negative for MERVAT   - d/c clonidine and increase HCTZ to 25 mg daily and increase spironolactone to 25 mg BID  - attempt to enroll in digital medicine program  - continue monitoring BP at home and record  - encouraged risk factor and lifestyle modifications (diet, exercise, and weight loss)     Polycystic kidney disease, autosomal dominant  Followed by renal.      CKD (chronic kidney disease), stage II  Followed by renal.      Hypertensive kidney disease with stage 2 chronic kidney disease  Followed by renal.      Type 2 diabetes mellitus with stage 2 chronic kidney disease, without long-term current use of insulin  Followed by PCP.      Class 3 severe obesity due to excess calories without serious comorbidity with body mass index (BMI) of 40.0 to 44.9 in adult  Followed by PCP.        Total duration of face to face visit time 30 minutes.  Total time spent counseling greater than fifty percent of total visit time.  Counseling included discussion regarding imaging findings, diagnosis, possibilities, treatment options, risks and benefits.  The patient had many questions regarding the options and long-term effects      Rosas Malhotra M.D.  Interventional Cardiology

## 2022-09-07 ENCOUNTER — PATIENT MESSAGE (OUTPATIENT)
Dept: CARDIOLOGY | Facility: CLINIC | Age: 47
End: 2022-09-07
Payer: MEDICARE

## 2022-09-07 NOTE — TELEPHONE ENCOUNTER
Called pt back, no response, lvm stating I need more clarification on med refill request and that they should give us a call back or send a message.

## 2022-09-07 NOTE — TELEPHONE ENCOUNTER
"Called pt for clarification, pt stated they are "not sure which meds can be refilled by cardiologist" and wants to refil on the ones that can be. Pt did not know specific medication names. I informed pt I will look into their meds and give them a call back.  "

## 2022-12-05 ENCOUNTER — PATIENT MESSAGE (OUTPATIENT)
Dept: CARDIOLOGY | Facility: CLINIC | Age: 47
End: 2022-12-05
Payer: MEDICARE

## 2022-12-06 RX ORDER — ASPIRIN 81 MG/1
81 TABLET ORAL DAILY
Qty: 90 TABLET | Refills: 3 | Status: SHIPPED | OUTPATIENT
Start: 2022-12-06

## 2022-12-12 ENCOUNTER — PATIENT MESSAGE (OUTPATIENT)
Dept: CARDIOLOGY | Facility: CLINIC | Age: 47
End: 2022-12-12
Payer: MEDICARE

## 2022-12-12 DIAGNOSIS — I10 ESSENTIAL HYPERTENSION: ICD-10-CM

## 2022-12-12 RX ORDER — CARVEDILOL 25 MG/1
TABLET ORAL
Qty: 270 TABLET | Refills: 3 | Status: SHIPPED | OUTPATIENT
Start: 2022-12-12 | End: 2023-11-09

## 2023-11-09 DIAGNOSIS — I10 ESSENTIAL HYPERTENSION: ICD-10-CM

## 2023-11-09 RX ORDER — CARVEDILOL 25 MG/1
TABLET ORAL
Qty: 270 TABLET | Refills: 3 | Status: SHIPPED | OUTPATIENT
Start: 2023-11-09